# Patient Record
Sex: FEMALE | Race: WHITE
[De-identification: names, ages, dates, MRNs, and addresses within clinical notes are randomized per-mention and may not be internally consistent; named-entity substitution may affect disease eponyms.]

---

## 2018-04-11 ENCOUNTER — HOSPITAL ENCOUNTER (INPATIENT)
Dept: HOSPITAL 68 - ERH | Age: 78
LOS: 5 days | DRG: 243 | End: 2018-04-16
Admitting: INTERNAL MEDICINE
Payer: COMMERCIAL

## 2018-04-11 VITALS — WEIGHT: 175.25 LBS | BODY MASS INDEX: 26.56 KG/M2 | HEIGHT: 68 IN

## 2018-04-11 DIAGNOSIS — I44.1: Primary | ICD-10-CM

## 2018-04-11 DIAGNOSIS — A77.49: ICD-10-CM

## 2018-04-11 DIAGNOSIS — R00.1: ICD-10-CM

## 2018-04-11 DIAGNOSIS — E03.9: ICD-10-CM

## 2018-04-11 DIAGNOSIS — E78.5: ICD-10-CM

## 2018-04-11 DIAGNOSIS — M06.9: ICD-10-CM

## 2018-04-11 DIAGNOSIS — Z86.718: ICD-10-CM

## 2018-04-11 DIAGNOSIS — R07.9: ICD-10-CM

## 2018-04-11 DIAGNOSIS — D69.6: ICD-10-CM

## 2018-04-11 DIAGNOSIS — Z88.8: ICD-10-CM

## 2018-04-11 LAB
ABSOLUTE GRANULOCYTE CT: 3.2 /CUMM (ref 1.4–6.5)
APTT BLD: 33 SEC (ref 25–37)
BASOPHILS # BLD: 0 /CUMM (ref 0–0.2)
BASOPHILS NFR BLD: 0.3 % (ref 0–2)
EOSINOPHIL # BLD: 0.1 /CUMM (ref 0–0.7)
EOSINOPHIL NFR BLD: 1.7 % (ref 0–5)
ERYTHROCYTE [DISTWIDTH] IN BLOOD BY AUTOMATED COUNT: 15.4 % (ref 11.5–14.5)
GRANULOCYTES NFR BLD: 64.7 % (ref 42.2–75.2)
HCT VFR BLD CALC: 32.8 % (ref 37–47)
LYMPHOCYTES # BLD: 1.3 /CUMM (ref 1.2–3.4)
MCH RBC QN AUTO: 33.1 PG (ref 27–31)
MCHC RBC AUTO-ENTMCNC: 35 G/DL (ref 33–37)
MCV RBC AUTO: 94.6 FL (ref 81–99)
MONOCYTES # BLD: 0.3 /CUMM (ref 0.1–0.6)
PLATELET # BLD: 125 /CUMM (ref 130–400)
PMV BLD AUTO: 7.6 FL (ref 7.4–10.4)
PROTHROMBIN TIME: 13.2 SEC (ref 9.4–12.5)
RED BLOOD CELL CT: 3.47 /CUMM (ref 4.2–5.4)
WBC # BLD AUTO: 4.9 /CUMM (ref 4.8–10.8)

## 2018-04-11 PROCEDURE — C1785 PMKR, DUAL, RATE-RESP: HCPCS

## 2018-04-11 PROCEDURE — 86618 LYME DISEASE ANTIBODY: CPT

## 2018-04-11 PROCEDURE — 87798 DETECT AGENT NOS DNA AMP: CPT

## 2018-04-11 PROCEDURE — C1898 LEAD, PMKR, OTHER THAN TRANS: HCPCS

## 2018-04-11 NOTE — ED CARDIAC/CP/PALPITATIONS
History of Present Illness
 
General
Chief Complaint: Dyspnea (COPD, CHF, Other)
Stated Complaint: PT IS HAVING PROBLEM BREATHING AND CHEST PAIN
Source: patient
Exam Limitations: no limitations
 
Vital Signs & Intake/Output
Vital Signs & Intake/Output
 Vital Signs
 
 
Date Time Temp Pulse Resp B/P B/P Pulse O2 O2 Flow FiO2
 
     Mean Ox Delivery Rate 
 
2237 97.2 41 20 187/71  99 Nasal 3.0L 
 
       Cannula  
 
2155      99 Nasal 2.0L 
 
       Cannula  
 
2058 98.1 37 18 179/79  98 Room Air 3.0L 
 
 
 
Allergies
Coded Allergies:
celecoxib (From CELEBREX) (UNKNOWN 18)
rofecoxib (From VIOXX) (UNKNOWN 18)
 
Reconcile Medications
Cholecalciferol (Vitamin D3) (Vitamin D) 1,000 UNIT TABLET   1 TAB PO DAILY 
SUPPLEMENT  (Reported)
Folic Acid 1 MG TABLET   1 TAB PO DAILY SUPPLEMENT  (Reported)
Hydroxychloroquine Sulfate 200 MG TABLET   1 TAB PO BID RA/OA  (Reported)
Levothyroxine Sodium (Synthroid) 25 MCG TABLET   1 TAB PO DAILY THYROID  (
Reported)
Methotrexate 2.5 MG TABLET   8 TAB PO QTUES RA  (Reported)
Simvastatin (Simvastatin*) 40 MG TABLET   1 TAB PO QPM CHOLESTEROL  (Reported)
 
Triage Nurses Notes Reviewed? yes
Onset: Gradual
Duration: day(s):, waxing and waning
Timing: recent history
Quality/Severity: moderate
Location: substernal, central
Radiation: neck
Activities at Onset: none
Prior Chest Pain/Card Workup: "I had a cath a few years ago... it was negative.
"
Nitro Today/Relief: no nitro taken today
Aspirin Today: no aspirin today
Associated Symptoms: chest pain. 
HPI:
76 YO WOMAN,
in prior good health
presents with 8/10 sub sternal chest pain, intermittently, over the past 3 days.
 
She notes occasional radiation to left neck, no syncopal symptoms, diaphoresis, 
chills, cough, wheezing.  
 
She presently has no chest pain. 
 
Past History
 
Travel History
Traveled to [place] past 21 day No
 
Medical History
Any Pertinent Medical History? see below for history
Cardiovascular: hyperlipidemia
Musculoskeletal: arthritis
Endocrine: hypothyroidism
Pneumonia Vaccine: 10/10/09
 
Surgical History
Surgical History: non-contributory
 
Psychosocial History
Who do you live with Patient/Self
Services at Home None
What is your primary language English
 
Family History
Hx Contributory? No
 
Review of Systems
 
Review of Systems
Constitutional:
Reports: no symptoms. 
EENTM:
Reports: no symptoms. 
Respiratory:
Reports: no symptoms. 
Cardiovascular:
Reports: no symptoms. 
GI:
Reports: no symptoms. 
Genitourinary:
Reports: no symptoms. 
Musculoskeletal:
Reports: no symptoms. 
Skin:
Reports: no symptoms. 
Neurological/Psychological:
Reports: no symptoms. 
Hematologic/Endocrine:
Reports: no symptoms. 
Immunologic/Allergic:
Reports: no symptoms. 
All Other Systems: Reviewed and Negative
 
Physical Exam
 
Physical Exam
General Appearance: well developed/nourished, mild distress
Head: atraumatic, normal appearance
Eyes:
Bilateral: normal appearance. 
Ears, Nose, Throat: normal pharynx, normal ENT inspection
Neck: normal inspection, supple, full range of motion
Respiratory: normal breath sounds, chest non-tender, no respiratory distress, 
quiet respiration, lungs clear
Cardiovascular: margy cardia
Gastrointestinal: normal bowel sounds, soft, non-tender, no organomegaly
Back: normal inspection
Extremities: normal inspection, normal capillary refill, normal range of motion,
no edema
Neurologic/Psych: no motor/sensory deficits, awake, alert, oriented x 3
Skin: intact, normal color, warm/dry
 
Core Measures
ACS in differential dx? No
CVA/TIA Diagnosis No
Sepsis Present: No
Sepsis Focused Exam Completed? No
 
Progress
Differential Diagnosis: ami, heart block vs other
Plan of Care:
 Orders
 
 
Procedure Date/time Status
 
Nothing by Mouth  B Active
 
Patient Data 2247 Active
 
Saline Lock 2236 Active
 
Misc Message 2236 Active
 
ED Holding Orders 2236 Active
 
Admit to inpatient 2236 Active
 
Vital Signs 2236 Active
 
Code Status 2236 Active
 
Add-on Test (ER Only) 2137 Active
 
EKG 2137 Active
 
Add-on Test (ER Only) 2133 Active
 
THYROID STIMULATING HORMONE 2052 Complete
 
LYME TITRE 2052 Active
 
PARTIAL THROMBOPLASTIN TIME 2048 Complete
 
PROTHROMBIN TIME 2048 Complete
 
D-DIMER 2048 Complete
 
TROPONIN LEVEL 2045 Complete
 
COMPREHENSIVE METABOLIC PANEL 2045 Complete
 
CBC WITHOUT DIFFERENTIAL 2045 Complete
 
EKG 2031 Active
 
 
 Laboratory Tests
 
 
 
18:
Anion Gap 12, Estimated GFR > 60, BUN/Creatinine Ratio 17.5, Glucose 97, Calcium
8.9, Total Bilirubin 1.5  H, AST 31, ALT 27, Alkaline Phosphatase 62, Troponin I
< 0.01, Total Protein 6.9, Albumin 3.8, Globulin 3.1, Albumin/Globulin Ratio 1.2
, TSH 5.190  H, PT 13.2  H, INR 1.21  H, APTT 33, D-Dimer High Sensitivty < 200,
CBC w Diff NO MAN DIFF REQ, RBC 3.47  L, MCV 94.6, MCH 33.1  H, MCHC 35.0, RDW 
15.4  H, MPV 7.6, Gran % 64.7, Lymphocytes % 26.7, Monocytes % 6.6, Eosinophils 
% 1.7, Basophils % 0.3, Absolute Granulocytes 3.2, Absolute Lymphocytes 1.3, 
Absolute Monocytes 0.3, Absolute Eosinophils 0.1, Absolute Basophils 0, Lyme 
Disease Antibody Pending
 
Diagnostic Imaging:
Viewed by Me: Radiology Read.  Discussed w/RAD: Radiology Read. 
CXR Impression: PATIENT: IMAN AL  MEDICAL RECORD NO: [id number] PRESENT 
AGE: 77  PATIENT ACCOUNT NO: [id number] : 40  LOCATION: [facility] ORDERING 
PHYSICIAN: Nick Norris MD     SERVICE DATE:  EXAM TYPE: 
RAD - XRY-PORTABLE CHEST XRAY EXAMINATION: XR PORTABLE CHEST CLINICAL 
INFORMATION: Chest pain COMPARISON: Chest x-ray 2016 TECHNIQUE: Portable 
frontal view of the chest was obtained. 9:01 PM FINDINGS: Lungs are clear. No 
pulmonary vascular congestion. There is no pleural effusion. The heart size is 
normal. The cardiac and mediastinal contours are normal. There are 
calcifications of the thoracic aorta. There are multilevel degenerative changes 
of dorsal spine. IMPRESSION: Unremarkable examination. DICTATED BY: Benjamin Hendrix MD  DATE/TIME DICTATED:18 :RAD.BACH  DATE/TIME 
TRANSCRIBED:18 CONFIDENTIAL, DO NOT COPY WITHOUT APPROPRIATE 
AUTHORIZATION.  <Electronically signed in Other Vendor System>                  
                                                                     SIGNED BY: 
Benjamin Hendrix MD 18
Initial ED EKG: sinus margy
Repeat EKG: changed (mobitz 2 block)
 
Departure
 
Departure
Disposition: STILL A PATIENT
Condition: Stable
Clinical Impression
Primary Impression: Bradycardia
Secondary Impressions: Chest pain
Referrals:
Koko Ibarra MD (PCP/Family)
 
Departure Forms:
Customer Survey
General Discharge Information
Comments
18, 21:33... discussed with dr. cartagena... pt with intermittent chest 
pain, sinus bradycardia... merits admission for further evaluation. 
 
18, 21:54... discussed with family at length.... pt comfortable in ED, 
mentating well, stable BP. 
 
18, 22:18... ekg w/ mobitz type ii block... discussed with dr. cartagena
 
Admission Note
Spoke With:
Laury Bennett MD
Documentation of Exam:
Documentation of any treatments & extenuating circumstances including Concerns 
Regarding Discharge (functional status, medication knowledge or non-compliance, 
living conditions, etc.) that warrant an admission rather than observation: 
 
pt with symptomatic bradycardia, with evidence of type II 2nd degree av block...
pt merits monitoring, rule out.... zoll pads placed on patient. 
 
 
Critical Care Note
 
Critical Care Note
Critical Care Time: 30-74 min

## 2018-04-11 NOTE — RADIOLOGY REPORT
EXAMINATION:
XR PORTABLE CHEST
 
CLINICAL INFORMATION:
Chest pain
 
COMPARISON:
Chest x-ray 09/30/2016
 
TECHNIQUE:
Portable frontal view of the chest was obtained. 9:01 PM
 
FINDINGS:
Lungs are clear. No pulmonary vascular congestion. There is no pleural
effusion. The heart size is normal. The cardiac and mediastinal contours are
normal. There are calcifications of the thoracic aorta. There are multilevel
degenerative changes of dorsal spine.
 
IMPRESSION:
Unremarkable examination.

## 2018-04-12 VITALS — SYSTOLIC BLOOD PRESSURE: 160 MMHG | DIASTOLIC BLOOD PRESSURE: 70 MMHG

## 2018-04-12 VITALS — SYSTOLIC BLOOD PRESSURE: 142 MMHG | DIASTOLIC BLOOD PRESSURE: 84 MMHG

## 2018-04-12 VITALS — DIASTOLIC BLOOD PRESSURE: 76 MMHG | SYSTOLIC BLOOD PRESSURE: 136 MMHG

## 2018-04-12 LAB
ABSOLUTE GRANULOCYTE CT: 2.7 /CUMM (ref 1.4–6.5)
BASOPHILS # BLD: 0 /CUMM (ref 0–0.2)
BASOPHILS NFR BLD: 0.5 % (ref 0–2)
EOSINOPHIL # BLD: 0.1 /CUMM (ref 0–0.7)
EOSINOPHIL NFR BLD: 2.4 % (ref 0–5)
ERYTHROCYTE [DISTWIDTH] IN BLOOD BY AUTOMATED COUNT: 15.6 % (ref 11.5–14.5)
GRANULOCYTES NFR BLD: 62.6 % (ref 42.2–75.2)
HCT VFR BLD CALC: 31 % (ref 37–47)
LYMPHOCYTES # BLD: 1.2 /CUMM (ref 1.2–3.4)
MCH RBC QN AUTO: 32.2 PG (ref 27–31)
MCHC RBC AUTO-ENTMCNC: 33.9 G/DL (ref 33–37)
MCV RBC AUTO: 95.1 FL (ref 81–99)
MONOCYTES # BLD: 0.3 /CUMM (ref 0.1–0.6)
PLATELET # BLD: 112 /CUMM (ref 130–400)
PMV BLD AUTO: 8.1 FL (ref 7.4–10.4)
RED BLOOD CELL CT: 3.26 /CUMM (ref 4.2–5.4)
WBC # BLD AUTO: 4.4 /CUMM (ref 4.8–10.8)

## 2018-04-12 NOTE — CONS- CARDIOLOGY
General Information and HPI
 
Consulting Request
Date of Consult: 04/12/18
Requested By:
Laury Bennett MD
 
Reason for Consult:
Bradycardia
Source of Information: patient
History of Present Illness:
 
This is a 77-year-old female with a past medical history of hypothyroidism, 
rheumatoid arthritis, remote history of DVT/PE, and hyperlipidemia who presented
to [facility] with a chief complaint of intermittent shortness of breath 
with weakness and some lightheadedness along with intermittent left-sided chest 
discomfort that is sometimes aggravated by cold air; she notes that the symptoms
have been intermittent over the last few months. She also mentions that she had 
a brief loss of consciousness approximately 2 weeks ago but did not seek any 
medical care. She did note having a tick bite approximately 3 weeks ago with 
mild erythema at the site; denies subjective fever or chills. Does have some 
malaise.  Denies slurring of speech or visual changes.  Denies palpitations.  
She does report having a cardiac catheterization many years ago which she 
believes was normal.
 
Allergies/Medications
Allergies:
Coded Allergies:
celecoxib (From CELEBREX) (UNKNOWN 04/11/18)
rofecoxib (From VIOXX) (UNKNOWN 04/11/18)
 
Home Med List:
Cholecalciferol (Vitamin D3) (Vitamin D) 1,000 UNIT TABLET   1 TAB PO DAILY 
SUPPLEMENT  (Reported)
Folic Acid 1 MG TABLET   1 TAB PO DAILY SUPPLEMENT  (Reported)
Hydroxychloroquine Sulfate 200 MG TABLET   1 TAB PO BID RA/OA  (Reported)
Levothyroxine Sodium (Synthroid) 25 MCG TABLET   1 TAB PO DAILY THYROID  (
Reported)
Methotrexate 2.5 MG TABLET   8 TAB PO QTUES RA  (Reported)
Simvastatin (Simvastatin*) 40 MG TABLET   1 TAB PO QPM CHOLESTEROL  (Reported)
 
Current Medications:
 Current Medications
 
 
  Sig/Sahara Start time  Last
 
Medication Dose Route Stop Time Status Admin
 
Acetaminophen 650 MG Q6P PRN 04/11 2315 AC 
 
  PO   
 
Atorvastatin Calcium 40 MG 1700 04/12 1700 AC 
 
  PO   
 
Cholecalciferol 1,000 IU DAILY 04/12 1000 AC 
 
  PO   
 
Enoxaparin Sodium 40 MG DAILY@0900 04/12 0900 AC 
 
  SC   
 
Folic Acid 1 MG DAILY 04/12 1000 AC 
 
  PO   
 
Hydroxychloroquine  200 MG BID 04/12 1000 AC 
 
Sulfate  PO   
 
Levothyroxine Sodium 0.025 MG DAILY AC 04/12 0730 AC 
 
  PO   
 
Methotrexate 20 MG QTUES 04/17 0700 AC 
 
  PO   
 
Multivitamins 1 TAB DAILY 04/12 1000 AC 
 
  PO   
 
 
 
 
Review of Systems
Review of Systems:
Review of systems as per HPI. The remainder of a 10 point review of systems was 
reviewed and was otherwise negative.
 
Past History
 
Travel History
Traveled to [place] past 21 day No
 
Medical History
Cardiovascular: hyperlipidemia
Musculoskeletal: arthritis
Endocrine: hypothyroidism
 
Surgical History
Surgical History: non-contributory
 
Psychosocial History
Services at Home: None
 
Exam & Diagnostic Data
Vital Signs and I&O
Vital Signs
 
 
Date Time Temp Pulse Resp B/P B/P Pulse O2 O2 Flow FiO2
 
     Mean Ox Delivery Rate 
 
04/12 0629 98.2 53 20 177/74  97 Room Air  
 
04/12 0507  34 20 169/67  95 Room Air  
 
04/12 0146  97 20 160/95  98 Room Air  
 
04/12 0000  61 20 168/75  97 Room Air  
 
04/11 2322 98.1 61 20 161/71  100 Nasal 3.0L 
 
       Cannula  
 
04/11 2237 97.2 41 20 187/71  99 Nasal 3.0L 
 
       Cannula  
 
04/11 2155      99 Nasal 2.0L 
 
       Cannula  
 
04/11 2058 98.1 37 18 179/79  98 Room Air 3.0L 
 
 
 Intake & Output
 
 
 04/12 0800 04/12 0000 04/11 1600 04/11 0800 04/11 0000 04/10 1600
 
Intake Total      
 
Output Total      
 
Balance      
 
       
 
Patient  173 lb    
 
Weight      
 
Weight  Reported by Patient    
 
Measurement      
 
Method      
 
 
 
Physical Exam:
General: no apparent distress. Alert.
Eyes: No obvious scleral icterus.
HEENT: No jugular venous distention or abnormal jugular venous pulsations.
Cardiovascular: Normal intensity S1/S2.  Regular bradycardia
Respiratory: Lungs clear to auscultation bilaterally.
Abdomen: Soft, nontender with no guarding or rebound tenderness.
Musculoskeletal: No clubbing or cyanosis noted
Skin: No obvious rashes or ulcerations.
Neurologic: No gross focal deficits noted.
Lymph: No gross lymphadenopathy. 
Labs/Dimas Results:
 Laboratory Tests
 
 
 04/12 04/11
 
 0504 2052
 
Chemistry  
 
  Sodium (137 - 145 mmol/L) 145 145
 
  Potassium (3.5 - 5.1 mmol/L) 4.3 3.9
 
  Chloride (98 - 107 mmol/L) 108  H 108  H
 
  Carbon Dioxide (22 - 30 mmol/L) 29 26
 
  Anion Gap (5 - 16) 8 12
 
  BUN (7 - 17 mg/dL) 14 14
 
  Creatinine (0.5 - 1.0 mg/dL) 0.8 0.8
 
  Estimated GFR (>60 ml/min) > 60 > 60
 
  BUN/Creatinine Ratio (7 - 25 %) 17.5 17.5
 
  Glucose (65 - 99 mg/dL)  97
 
  Calcium (8.4 - 10.2 mg/dL)  8.9
 
  Total Bilirubin (0.2 - 1.3 mg/dL)  1.5  H
 
  AST (14 - 36 U/L)  31
 
  ALT (9 - 52 U/L)  27
 
  Alkaline Phosphatase (<127 U/L)  62
 
  Troponin I (< 0.11 ng/ml)  < 0.01
 
  Total Protein (6.3 - 8.2 g/dL)  6.9
 
  Albumin (3.5 - 5.0 g/dL)  3.8
 
  Globulin (1.9 - 4.2 gm/dL)  3.1
 
  Albumin/Globulin Ratio (1.1 - 2.2 %)  1.2
 
  TSH (0.270 - 4.200 uIU/mL)  5.190  H
 
  Thyroxine (T4) (4.5 - 10.9 ug/dL)  8.3
 
  Total T3 (0.97 - 1.69 ng/mL)  1.11
 
Coagulation  
 
  PT (9.4 - 12.5 SEC)  13.2  H
 
  INR (0.90 - 1.19)  1.21  H
 
  APTT (25 - 37 SEC)  33
 
  D-Dimer High Sensitivty (0 - 243 ng/ml)  < 200
 
Hematology  
 
  CBC w Diff NO MAN DIFF REQ NO MAN DIFF REQ
 
  WBC (4.8 - 10.8 /CUMM) 4.4  L 4.9
 
  RBC (4.20 - 5.40 /CUMM) 3.26  L 3.47  L
 
  Hgb (12.0 - 16.0 G/DL) 10.5  L 11.4  L
 
  Hct (37 - 47 %) 31.0  L 32.8  L
 
  MCV (81.0 - 99.0 FL) 95.1 94.6
 
  MCH (27.0 - 31.0 PG) 32.2  H 33.1  H
 
  MCHC (33.0 - 37.0 G/DL) 33.9 35.0
 
  RDW (11.5 - 14.5 %) 15.6  H 15.4  H
 
  Plt Count (130 - 400 /CUMM) 112  L 125  L
 
  MPV (7.4 - 10.4 FL) 8.1 7.6
 
  Gran % (42.2 - 75.2 %) 62.6 64.7
 
  Lymphocytes % (20.5 - 51.1 %) 28.4 26.7
 
  Monocytes % (1.7 - 9.3 %) 6.1 6.6
 
  Eosinophils % (0 - 5 %) 2.4 1.7
 
  Basophils % (0.0 - 2.0 %) 0.5 0.3
 
  Absolute Granulocytes (1.4 - 6.5 /CUMM) 2.7 3.2
 
  Absolute Lymphocytes (1.2 - 3.4 /CUMM) 1.2 1.3
 
  Absolute Monocytes (0.10 - 0.60 /CUMM) 0.3 0.3
 
  Absolute Eosinophils (0.0 - 0.7 /CUMM) 0.1 0.1
 
  Absolute Basophils (0.0 - 0.2 /CUMM) 0 0
 
Serology  
 
  Lyme Disease Antibody  Pending
 
 
 
 
Diagnostic Data
EKG Results
Tracing was personally reviewed and shows a sinus rhythm with 2-1 AV block and a
ventricular response rate of approximately 34 bpm with left axis deviation
CXR Results
No CHF
Other Results
Telemetry tracings were personally reviewed and shows sinus bradycardia with 
intermittent 2-1 AV block; no prolonged pauses noted
 
Assessment/Plan
Assessment/Plan
 
1.  Symptomatic bradycardia with 2-1 AV block
2.  History of hypothyroidism
3.  Intermittent chest pain/shortness of breath
4.  History of rheumatoid arthritis
5.  History of hyperlipidemia
6.  Remote history of DVT/PE
 
The patient's symptoms may be due to her bradycardia with 2-1 AV block; unlikely
due to ACS or CHF but would recommend serial troponins and transthoracic 
echocardiogram. She does report a tick bite approximately 3 weeks ago; agree 
with workup for Lyme disease but if negative then she will likely need a 
permanent pacemaker. No other clearly reversible causes of bradycardia noted at 
this time. D-dimer is within normal limits which has high negative predictive 
value for pulmonary embolus. She may be a candidate for ischemic testing in the 
future.
 
 
Theodore Tijerina MD [facility]
 
 
Consult Acknowledgment
- Thank you for your consult request.

## 2018-04-12 NOTE — CONS- INFECT DISEASE
General Information and HPI
 
Consulting Request
Date of Consult: 04/12/18
Requested By:
Laury Bennett MD
 
Reason for Consult:
Rule out Lyme carditis
Source of Information: patient, family
History of Present Illness:
This is a 77-year-old woman with a history of rheumatoid arthritis, maintained 
on hydroxychloroquine and methotrexate, hypothyroidism and hyperlipidemia 
admitted on April 11 after presenting to the emergency room with a 6 month 
history of palpitations, associated with chest pressure, radiating to the left 
shoulder, and shortness of breath, worse on exertion, increased in frequency 
recently, with one syncopal episode several weeks prior to admission and status 
post removal of a tick from her left shoulder 1 week prior to admission.  On 
admission she was afebrile, with a heart rate of 37.  Laboratory data revealed a
white blood cell count of 5000, H&H 11 and 33, platelets 125,000, BUN/creatinine
14 and 0.8, bilirubin 1.5, SH 5.19, INR 1.21.  Chest x-ray was negative.  EKG 
revealed a 2:1 AV heart block and she has been evaluated by Cardiology for a 
possible pacemaker.
 
 
Allergies/Medications
Allergies:
Coded Allergies:
celecoxib (From CELEBREX) (UNKNOWN 04/11/18)
rofecoxib (From VIOXX) (UNKNOWN 04/11/18)
 
Home Med List:
Cholecalciferol (Vitamin D3) (Vitamin D) 1,000 UNIT TABLET   1 TAB PO DAILY 
SUPPLEMENT  (Reported)
Folic Acid 1 MG TABLET   1 TAB PO DAILY SUPPLEMENT  (Reported)
Hydroxychloroquine Sulfate 200 MG TABLET   1 TAB PO BID RA/OA  (Reported)
Levothyroxine Sodium (Synthroid) 25 MCG TABLET   1 TAB PO DAILY THYROID  (
Reported)
Methotrexate 2.5 MG TABLET   8 TAB PO QTUES RA  (Reported)
Simvastatin (Simvastatin*) 40 MG TABLET   1 TAB PO QPM CHOLESTEROL  (Reported)
 
 
Past History
 
Travel History
Traveled to [place] past 21 day No
 
Medical History
Cardiovascular: hyperlipidemia
Musculoskeletal: arthritis
Endocrine: hypothyroidism
Blood Disorders: DVT (1968), PE (1968)
Pneumonia Vaccine: 10/10/09
 
Surgical History
Surgical History: knee replacement (bilateral)
 
Psychosocial History
Services at Home: None
 
Review of Systems
 
Review of Systems
All Other Systems: Reviewed and Negative
 
Exam & Diagnostic Data
Last 24 Hrs of Vital Signs/I&O
 Vital Signs
 
 
Date Time Temp Pulse Resp B/P B/P Pulse O2 O2 Flow FiO2
 
     Mean Ox Delivery Rate 
 
04/12 1350 98.5 62 20 160/70  97 Room Air  
 
04/12 1058 98.5 32 20 176/72  96 Room Air  
 
04/12 0745 98.0 63 20 175/74  96 Room Air  
 
04/12 0629 98.2 53 20 177/74  97 Room Air  
 
04/12 0507  34 20 169/67  95 Room Air  
 
04/12 0146  97 20 160/95  98 Room Air  
 
04/12 0000  61 20 168/75  97 Room Air  
 
04/11 2322 98.1 61 20 161/71  100 Nasal 3.0L 
 
       Cannula  
 
04/11 2237 97.2 41 20 187/71  99 Nasal 3.0L 
 
       Cannula  
 
04/11 2155      99 Nasal 2.0L 
 
       Cannula  
 
04/11 2058 98.1 37 18 179/79  98 Room Air 3.0L 
 
 
 Intake & Output
 
 
 04/12 1600 04/12 0800 04/12 0000
 
Intake Total   
 
Output Total   
 
Balance   
 
    
 
Patient   173 lb
 
Weight   
 
Weight   Reported by Patient
 
Measurement   
 
Method   
 
 
 
 
Physical Exam
Other Physical Findings:
Afebrile.  She is awake and alert in no acute distress.  Skin reveals no rash.  
HEENT negative.  Neck is supple with no adenopathy.  Lungs are clear.  Heart 
bradycardic, with a soft diastolic murmur.  Abdomen is soft, nontender with 
positive bowel sounds.  Back no CVA tenderness.  Extremities no cyanosis, 
clubbing or edema.  Neuro is without focality.
 
Last 24 Hours of Lab Results:
 Laboratory Tests
 
 
 04/12 04/11
 
 0504 2052
 
Chemistry  
 
  Sodium (137 - 145 mmol/L) 145 145
 
  Potassium (3.5 - 5.1 mmol/L) 4.3 3.9
 
  Chloride (98 - 107 mmol/L) 108  H 108  H
 
  Carbon Dioxide (22 - 30 mmol/L) 29 26
 
  Anion Gap (5 - 16) 8 12
 
  BUN (7 - 17 mg/dL) 14 14
 
  Creatinine (0.5 - 1.0 mg/dL) 0.8 0.8
 
  Estimated GFR (>60 ml/min) > 60 > 60
 
  BUN/Creatinine Ratio (7 - 25 %) 17.5 17.5
 
  Glucose (65 - 99 mg/dL)  97
 
  Calcium (8.4 - 10.2 mg/dL)  8.9
 
  Total Bilirubin (0.2 - 1.3 mg/dL)  1.5  H
 
  AST (14 - 36 U/L)  31
 
  ALT (9 - 52 U/L)  27
 
  Alkaline Phosphatase (<127 U/L)  62
 
  Troponin I (< 0.11 ng/ml)  < 0.01
 
  Total Protein (6.3 - 8.2 g/dL)  6.9
 
  Albumin (3.5 - 5.0 g/dL)  3.8
 
  Globulin (1.9 - 4.2 gm/dL)  3.1
 
  Albumin/Globulin Ratio (1.1 - 2.2 %)  1.2
 
  TSH (0.270 - 4.200 uIU/mL)  5.190  H
 
  Thyroxine (T4) (4.5 - 10.9 ug/dL)  8.3
 
  Total T3 (0.97 - 1.69 ng/mL)  1.11
 
Coagulation  
 
  PT (9.4 - 12.5 SEC)  13.2  H
 
  INR (0.90 - 1.19)  1.21  H
 
  APTT (25 - 37 SEC)  33
 
  D-Dimer High Sensitivty (0 - 243 ng/ml)  < 200
 
Hematology  
 
  CBC w Diff NO MAN DIFF REQ NO MAN DIFF REQ
 
  WBC (4.8 - 10.8 /CUMM) 4.4  L 4.9
 
  RBC (4.20 - 5.40 /CUMM) 3.26  L 3.47  L
 
  Hgb (12.0 - 16.0 G/DL) 10.5  L 11.4  L
 
  Hct (37 - 47 %) 31.0  L 32.8  L
 
  MCV (81.0 - 99.0 FL) 95.1 94.6
 
  MCH (27.0 - 31.0 PG) 32.2  H 33.1  H
 
  MCHC (33.0 - 37.0 G/DL) 33.9 35.0
 
  RDW (11.5 - 14.5 %) 15.6  H 15.4  H
 
  Plt Count (130 - 400 /CUMM) 112  L 125  L
 
  MPV (7.4 - 10.4 FL) 8.1 7.6
 
  Gran % (42.2 - 75.2 %) 62.6 64.7
 
  Lymphocytes % (20.5 - 51.1 %) 28.4 26.7
 
  Monocytes % (1.7 - 9.3 %) 6.1 6.6
 
  Eosinophils % (0 - 5 %) 2.4 1.7
 
  Basophils % (0.0 - 2.0 %) 0.5 0.3
 
  Absolute Granulocytes (1.4 - 6.5 /CUMM) 2.7 3.2
 
  Absolute Lymphocytes (1.2 - 3.4 /CUMM) 1.2 1.3
 
  Absolute Monocytes (0.10 - 0.60 /CUMM) 0.3 0.3
 
  Absolute Eosinophils (0.0 - 0.7 /CUMM) 0.1 0.1
 
  Absolute Basophils (0.0 - 0.2 /CUMM) 0 0
 
Serology  
 
  Lyme Disease Antibody (RATIO)  0.08
 
 
 
Last 24 Hours of Dimas Results:
No cultures obtained
 
 
Diagnostic Data
Recent Imaging Findings:
Chest x-ray April 11 negative
 
 
Assessment/Plan
 
Assessment/Plan
Impression:
This is a 77-year-old woman with a history of rheumatoid arthritis, maintained 
on hydroxychloroquine and methotrexate, admitted on April 11 with a 6 month 
history of palpitations, associated with chest pressure, radiating to the left 
shoulder, and shortness of breath, worse on exertion, increased in frequency 
recently, with one syncopal episode several weeks prior to admission and status 
post removal of a tick from her left shoulder 1 week prior to admission, found 
to be afebrile with bradycardia and a 2:1 heart block on EKG, with a mild 
thrombocytopenia and a negative Lyme titer. 
 
She appears to have symptomatic bradycardia and, per Cardiology, she will likely
require a permanent pacemaker.  Lyme carditis is possible but, as this 
represents a second stage of Lyme, the Lyme titer is usually positive; therefore
, with a negative titer, it is less likely, particularly as her symptoms have 
been going on for the past 6 months.  Her thrombocytopenia may be medication 
related (for example the hydroxychloroquine or the methotrexate) but, given her 
recent tick bite, Anaplasma could be considered, and it may be reasonable to 
empirically treat her for this pending further evaluation.  Of note Anaplasma is
not associated with cardiac abnormalities.
 
Suggestion:
1.  Serum for PCR for Anaplasma
2.  Further management with regard to possible pacemaker placement per 
Cardiology
3.  Begin Doxycycline 100 mg po every 12 hours pending above
 
 
 
Consult Acknowledgment
- Thank you for your consult request.

## 2018-04-12 NOTE — ADMISSION CERTIFICATION
Admission Certification
 
Certification Statement
- As attending physician, I certify that at the time of
- admission, based on clinical presentation, severity of
- symptoms, need for further diagnostic testing and
- therapeutic interventions, and risk of adverse outcomes
- without in-hospital treatment, in my clinical assessment,
- this patient requires an acute hospital stay for a minimum
- of two nights or longer. I have also considered psychsocial
- factors such as support system, advanced age, financial
- issues, cognitive issues, and failed out-patient treatments,
- past re-admission history, safety of patient, and lack of
- compliance as applicable.
Specific rationale supporting this admission is:
Symptomatic bradycardia with Mobitz type II block 2:1

## 2018-04-12 NOTE — PN- HOUSESTAFF
Kavon Khan 18 0932:
Subjective
Follow-up For:
Symptomatic bradycardia
Possible Lyme disease
Subjective:
The patient was seen and examined.  She reports that she experiences dizziness 
when she moves.  Also has been noticing occasional palpitation.  Per patient, 
her symptoms started 4-5 months ago and gradually have been worsening.  She had 
tick bite to her left shoulder 2 weeks ago with a rash did not seek any medical 
attention.
 
Per patient, she was diagnosed with hypothyroidism recently by her primary care 
doctor and was started  on levothyroxine about a month ago.  She levothyroxine 
for 1 week, started experiencing lightheadedness which she attributed to the 
medication therefore stopped the medication. 
 
Continues to be bradycardic.
 
Review of Systems
Constitutional:
Reports: see HPI. 
 
Objective
Last 24 Hrs of Vital Signs/I&O
 Vital Signs
 
 
Date Time Temp Pulse Resp B/P B/P Pulse O2 O2 Flow FiO2
 
     Mean Ox Delivery Rate 
 
 1756 98.6 37 18 160/80  96 Room Air  
 
 1604 98.1 34 18 160/62  97 Room Air  
 
 1400 98.5 62 20 160/70  97 Room Air  
 
 1350 98.5 62 20 160/70  97 Room Air  
 
 1058 98.5 32 20 176/72  96 Room Air  
 
 0745 98.0 63 20 175/74  96 Room Air  
 
 0629 98.2 53 20 177/74  97 Room Air  
 
 0507  34 20 169/67  95 Room Air  
 
 0146  97 20 160/95  98 Room Air  
 
 0000  61 20 168/75  97 Room Air  
 
 2322 98.1 61 20 161/71  100 Nasal 3.0L 
 
       Cannula  
 
 2237 97.2 41 20 187/71  99 Nasal 3.0L 
 
       Cannula  
 
5      99 Nasal 2.0L 
 
       Cannula  
 
2058 98.1 37 18 179/79  98 Room Air 3.0L 
 
 
 Intake & Output
 
 
  1600  0800  0000
 
Intake Total 240  
 
Output Total   
 
Balance 240  
 
    
 
Intake, Oral 240  
 
Patient 173 lb  173 lb
 
Weight   
 
Weight   Reported by Patient
 
Measurement   
 
Method   
 
 
 
 
Physical Exam
General Appearance: Alert, Oriented X3, Cooperative, No Acute Distress
Skin: No Rashes
HEENT: Atraumatic, PERRLA, EOMI, Mucous Membr. moist/pink
Neck: Supple
Cardiovascular: Bradycardic
Lungs: Clear to Auscultation, Normal Air Movement
Abdomen: Normal Bowel Sounds, Soft, No Tenderness, No Hepatospenomegaly, No 
Masses
Neurological: Normal Speech, Strength at 5/5 X4 Ext, Normal Tone, Sensation 
Intact, Cranial Nerves 3-12 NL
Extremities: No Clubbing, No Cyanosis, No Edema, Normal Pulses, No Tenderness/
Swelling
Current Medications:
 Current Medications
 
 
  Sig/Sahara Start time  Last
 
Medication Dose Route Stop Time Status Admin
 
Acetaminophen 650 MG Q6P PRN  2315 AC 
 
  PO   
 
Atorvastatin Calcium 40 MG 1700  1700 AC 
 
  PO   
 
Cholecalciferol 1,000 IU DAILY  1045 AC 
 
  PO   1050
 
Cholecalciferol 1,000 IU DAILY  1000 DC 
 
  PO   
 
Doxycycline Hyclate 100 MG BID  2100 AC 
 
  PO   
 
Enoxaparin Sodium 40 MG DAILY@0900  0900 DC 
 
  SC   0850
 
Folic Acid 1 MG DAILY  1045 AC 
 
  PO   1050
 
Folic Acid 1 MG DAILY  1000 DC 
 
  PO   
 
Hydroxychloroquine  200 MG BID  1046 AC 
 
Sulfate  PO   1050
 
Hydroxychloroquine  200 MG BID  1000 DC 
 
Sulfate  PO   
 
Levothyroxine Sodium 0.025 MG DAILY AC  0730 AC 
 
  PO   0745
 
Methotrexate 20 MG QTUES  0700 AC 
 
  PO   
 
Multivitamins 1 TAB DAILY  1047 AC 
 
  PO   1050
 
Multivitamins 1 TAB DAILY  1000 DC 
 
  PO   
 
 
 
 
Last 24 Hrs of Lab/Dimas Results
Last 24 Hrs of Labs/Mics:
 Laboratory Tests
 
18 0504:
Anion Gap 8, Estimated GFR > 60, BUN/Creatinine Ratio 17.5, CBC w Diff NO MAN 
DIFF REQ, RBC 3.26  L, MCV 95.1, MCH 32.2  H, MCHC 33.9, RDW 15.6  H, MPV 8.1, 
Gran % 62.6, Lymphocytes % 28.4, Monocytes % 6.1, Eosinophils % 2.4, Basophils %
0.5, Absolute Granulocytes 2.7, Absolute Lymphocytes 1.2, Absolute Monocytes 0.3
, Absolute Eosinophils 0.1, Absolute Basophils 0, A.phagocytophil DNA PCR 
Pending
 
18:
Anion Gap 12, Estimated GFR > 60, BUN/Creatinine Ratio 17.5, Glucose 97, Calcium
8.9, Total Bilirubin 1.5  H, AST 31, ALT 27, Alkaline Phosphatase 62, Troponin I
< 0.01, Total Protein 6.9, Albumin 3.8, Globulin 3.1, Albumin/Globulin Ratio 1.2
, TSH 5.190  H, Thyroxine (T4) 8.3, Total T3 1.11, PT 13.2  H, INR 1.21  H, APTT
33, D-Dimer High Sensitivty < 200, CBC w Diff NO MAN DIFF REQ, RBC 3.47  L, MCV 
94.6, MCH 33.1  H, MCHC 35.0, RDW 15.4  H, MPV 7.6, Gran % 64.7, Lymphocytes % 
26.7, Monocytes % 6.6, Eosinophils % 1.7, Basophils % 0.3, Absolute Granulocytes
3.2, Absolute Lymphocytes 1.3, Absolute Monocytes 0.3, Absolute Eosinophils 0.1,
Absolute Basophils 0, Lyme Disease Antibody 0.08
 
 
Assessment/Plan
Assessment:
This is a 77-year-old lady with past medical history significant for RA on 
hydroxychloroquine, recently diagnosed hypothyroidism, hyperlipidemia, who 
presented to the hospital with palpitation, shortness of breath, and dizziness.
 
Reports having history of tick bite 2 weeks ago on her left shoulder with a 
rash.
 
Problem list
* Symptomatic bradycardia, Mobitz type II block
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 
hypothyroidism 
Plan
* Monitor on telemetry
* Keep transcutaneus pacer ready
* Lyme titer PCR for anaplasma
* ID consult appreciated, will start the patient on doxycycline
* Will check serial trop and EKG
* Cardiology consult appreciated, nothing by mouth after midnight for possible 
pacemaker insertion
* Echocardiogram pending
* Continue levothyroxine
* DVT prophylaxis at all times
Problem List:
 1. Bradycardia
 
Pain Ratin
Pain Location:
NA
Pain Goal: Remain pain free
Pain Plan:
NA
Tomorrow's Labs & Rationales:
CBC to monitor Plt and H&H
 
 
Alexander Gerber 18 1309:
Attending MD Review Statement
 
Attending Statement
Attending MD Statement: examined this patient, discuss w/resident/PA/NP, agreed 
w/resident/PA/NP, discussed with family, reviewed EMR data (avail), discussed 
with nursing, discussed with case mgmt, reviewed images, amended to note
Attending Assessment/Plan:
77 o/f with pmh of hypothyroidism, rheumatoid arthritis, remote history of DVT/
PE, and hyperlipidemia who came shortness of breath and lightheadedness with 
atypical chest pain. Patient gives history of tick bite. 
 
Patient admitted to telemetry montioring for bradycardia 2-1 AV block. Serial 
cardiac enzymes r/o MI. Cardiology consult. Pacer bedside. ECHO, ID consult ?abx
for ?lyme disease. Avoid medications causing bradycardia.

## 2018-04-13 VITALS — SYSTOLIC BLOOD PRESSURE: 140 MMHG | DIASTOLIC BLOOD PRESSURE: 78 MMHG

## 2018-04-13 VITALS — DIASTOLIC BLOOD PRESSURE: 72 MMHG | SYSTOLIC BLOOD PRESSURE: 158 MMHG

## 2018-04-13 LAB
ABSOLUTE GRANULOCYTE CT: 2.3 /CUMM (ref 1.4–6.5)
BASOPHILS # BLD: 0 /CUMM (ref 0–0.2)
BASOPHILS NFR BLD: 0.5 % (ref 0–2)
EOSINOPHIL # BLD: 0.1 /CUMM (ref 0–0.7)
EOSINOPHIL NFR BLD: 2.2 % (ref 0–5)
ERYTHROCYTE [DISTWIDTH] IN BLOOD BY AUTOMATED COUNT: 15.5 % (ref 11.5–14.5)
GRANULOCYTES NFR BLD: 61.1 % (ref 42.2–75.2)
HCT VFR BLD CALC: 33.8 % (ref 37–47)
LYMPHOCYTES # BLD: 1.2 /CUMM (ref 1.2–3.4)
MCH RBC QN AUTO: 32.6 PG (ref 27–31)
MCHC RBC AUTO-ENTMCNC: 34.2 G/DL (ref 33–37)
MCV RBC AUTO: 95.1 FL (ref 81–99)
MONOCYTES # BLD: 0.2 /CUMM (ref 0.1–0.6)
PLATELET # BLD: 126 /CUMM (ref 130–400)
PMV BLD AUTO: 8 FL (ref 7.4–10.4)
RED BLOOD CELL CT: 3.56 /CUMM (ref 4.2–5.4)
WBC # BLD AUTO: 3.8 /CUMM (ref 4.8–10.8)

## 2018-04-13 PROCEDURE — 0JH606Z INSERTION OF PACEMAKER, DUAL CHAMBER INTO CHEST SUBCUTANEOUS TISSUE AND FASCIA, OPEN APPROACH: ICD-10-PCS | Performed by: THORACIC SURGERY (CARDIOTHORACIC VASCULAR SURGERY)

## 2018-04-13 PROCEDURE — 02HK3JZ INSERTION OF PACEMAKER LEAD INTO RIGHT VENTRICLE, PERCUTANEOUS APPROACH: ICD-10-PCS | Performed by: THORACIC SURGERY (CARDIOTHORACIC VASCULAR SURGERY)

## 2018-04-13 PROCEDURE — 02H63JZ INSERTION OF PACEMAKER LEAD INTO RIGHT ATRIUM, PERCUTANEOUS APPROACH: ICD-10-PCS | Performed by: THORACIC SURGERY (CARDIOTHORACIC VASCULAR SURGERY)

## 2018-04-13 NOTE — OPERATIVE REPORT
Operative/Inv Procedure Report
Surgery Date: 04/13/18
Name of Procedure:
MRI compatible dual-chamber permanent pacemaker
Pre-Operative Diagnosis:
Symptomatic bradycardia with second-degree heart block
Post-Operative Diagnosis:
Same
Estimated Blood Loss: less than 50ml
Surgeon/Assistant:
Bogdan Almaguer MD
 
Anesthesia: local monitored anesthesi
 
Operative/Procedure Note
Note:
After placement of monitoring lines a verification of a transcutaneous pacemaker
device the patient's left chest and shoulder area were prepped and draped in a 
sterile fashion.  1% lidocaine was used local anesthetic.  Incision was made in 
the deltopectoral groove and carried down to prepectoralis fascia.  The cephalic
vein was encountered and was encircled with surgical ties.
 
The vein was occluded proximally a small venotomy was made.  A guidewire was 
passed under fluoroscopic guidance into the right atrium.  A 9 Macanese sheath 
dilator was passed over the wire and a ventricular lead Medtronic model #[id number] 
was then advanced the pulmonary outflow tract.  It was withdrawn into the right 
ventricular chamber and positioned at the apex.
 
R waves were sensed at 6.8 mV.  The pacing threshold was 0.4 V with a current of
0.2 mA and an impedance of 1612 ohms.
 
A 7 Macanese sheath dilator was then passed over the retained wire and a preformed
atrial lead Medtronic model #[id number] was then positioned in the right atrial 
appendage under fluoroscopic guidance.
 
P waves were measured at 1.5 mV.  The pacing threshold was 0.5 V with a current 
of 0.8 mA and an impedance of 583 ohms.
 
The leads were tied to the cephalic vein and then sutured to the prepectoralis 
fascia.  A pacemaker pocket site was made with electrocautery.  The leads were 
then connected to a Medtronic MRI compatible dual-chamber pacemaker.
 
The pacemaker pocket was irrigated with antibiotic irrigation.  Hemostasis was 
achieved with electrocautery and with surgical clips.  The wound was closed in 
layers deep Vicryl suture followed by running Vicryl subcuticular suture.  It 
was dressed with dry sterile dressing and a pressure dressing.  The patient 
tolerated procedure well and was brought to the recovery room in stable 
condition.
CC:
Breezy DYE,Travon

## 2018-04-13 NOTE — PN- CARDIOLOGY
Subjective
Subjective:
 
Doing very well and is feeling better status post permanent pacemaker.
 
Objective
Vital Signs and I&Os
Vital Signs
 
 
Date Time Temp Pulse Resp B/P B/P Pulse O2 O2 Flow FiO2
 
     Mean Ox Delivery Rate 
 
04/13 0653 98.7 33 20 158/72  94 Room Air  
 
04/12 2224 98.4 32 20 136/76  94   
 
04/12 2043 98.2 36 20 142/84  96   
 
04/12 1756 98.6 37 18 160/80  96 Room Air  
 
04/12 1604 98.1 34 18 160/62  97 Room Air  
 
 
 Intake & Output
 
 
 04/13 1600 04/13 0800 04/13 0000 04/12 1600 04/12 0800 04/12 0000
 
Intake Total 580   240  
 
Output Total 600  500   
 
Balance -20  -500 240  
 
       
 
Intake,      
 
Intake, Oral 480   240  
 
Output, Urine 600  500   
 
Patient   183 lb 173 lb  173 lb
 
Weight      
 
Weight      Reported by Patient
 
Measurement      
 
Method      
 
 
 
Physical Exam:
 
General: no apparent distress. Alert.
Eyes: No obvious scleral icterus.
HEENT: No jugular venous distention or abnormal jugular venous pulsations.
Cardiovascular: Normal intensity S1/S2.  Left sided pressure dressing
Respiratory: Lungs clear to auscultation bilaterally.
Abdomen: Soft, nontender with no guarding or rebound tenderness.
Musculoskeletal: No clubbing or cyanosis noted
Skin: No obvious rashes or ulcerations.
Neurologic: No gross focal deficits noted.
 
Current Medications:
 Current Medications
 
 
  Sig/Sahara Start time  Last
 
Medication Dose Route Stop Time Status Admin
 
Acetaminophen 1,000 MG Q6H 04/13 1000 AC 04/13
 
N/A 1 UNIT IV 04/14 0414  1130
 
Acetaminophen 650 MG Q6P PRN 04/11 2315 DC 
 
  PO   
 
Atorvastatin Calcium 40 MG 1700 04/12 1700 AC 04/12
 
  PO   1914
 
Cefazolin Sodium 2,000 MG IQ8 04/13 1600 CAN 
 
  IV 04/14 0001  
 
Cefazolin Sodium 2 GM IQ8 04/13 1600 AC 
 
N/A 1 UNIT IV 04/14 0029  
 
Cholecalciferol 1,000 IU DAILY 04/12 1045 AC 04/13
 
  PO   1123
 
Doxycycline Hyclate 100 MG BID 04/12 2100 AC 04/13
 
  PO   1123
 
Enoxaparin Sodium 40 MG DAILY 04/14 0900 AC 
 
  SC   
 
Enoxaparin Sodium 40 MG DAILY@0900 04/12 0900 DC 04/12
 
  SC   0850
 
Folic Acid 1 MG DAILY 04/12 1045 AC 04/13
 
  PO   1123
 
Hydroxychloroquine  200 MG BID 04/12 1046 AC 04/13
 
Sulfate  PO   1123
 
Levothyroxine Sodium 0.025 MG DAILY AC 04/12 0730 AC 04/13
 
  PO   0547
 
Methotrexate 20 MG QTUES 04/17 0700 AC 
 
  PO   
 
Multivitamins 1 TAB DAILY 04/12 1047 AC 04/13
 
  PO   1123
 
Oxycodone HCl 5 MG Q4-6 PRN PRN 04/13 1000 AC 
 
  PO   
 
Oxycodone HCl 10 MG Q4-6 PRN PRN 04/13 1000 AC 
 
  PO   
 
 
 
 
Results
Last 48 Hrs of Labs/Mics:
 Laboratory Tests
 
04/13/18 0720:
CBC w Diff NO MAN DIFF REQ, RBC 3.56  L, MCV 95.1, MCH 32.6  H, MCHC 34.2, RDW 
15.5  H, MPV 8.0, Gran % 61.1, Lymphocytes % 30.3, Monocytes % 5.9, Eosinophils 
% 2.2, Basophils % 0.5, Absolute Granulocytes 2.3, Absolute Lymphocytes 1.2, 
Absolute Monocytes 0.2, Absolute Eosinophils 0.1, Absolute Basophils 0
 
04/13/18 0040:
Troponin I < 0.01
 
04/12/18 1925:
Troponin I < 0.01
 
04/12/18 0504:
Anion Gap 8, Estimated GFR > 60, BUN/Creatinine Ratio 17.5, CBC w Diff NO MAN 
DIFF REQ, RBC 3.26  L, MCV 95.1, MCH 32.2  H, MCHC 33.9, RDW 15.6  H, MPV 8.1, 
Gran % 62.6, Lymphocytes % 28.4, Monocytes % 6.1, Eosinophils % 2.4, Basophils %
0.5, Absolute Granulocytes 2.7, Absolute Lymphocytes 1.2, Absolute Monocytes 0.3
, Absolute Eosinophils 0.1, Absolute Basophils 0, A.phagocytophil DNA PCR 
Pending
 
04/11/18 2052:
Anion Gap 12, Estimated GFR > 60, BUN/Creatinine Ratio 17.5, Glucose 97, Calcium
8.9, Total Bilirubin 1.5  H, AST 31, ALT 27, Alkaline Phosphatase 62, Troponin I
< 0.01, Total Protein 6.9, Albumin 3.8, Globulin 3.1, Albumin/Globulin Ratio 1.2
, TSH 5.190  H, Thyroxine (T4) 8.3, Total T3 1.11, PT 13.2  H, INR 1.21  H, APTT
33, D-Dimer High Sensitivty < 200, CBC w Diff NO MAN DIFF REQ, RBC 3.47  L, MCV 
94.6, MCH 33.1  H, MCHC 35.0, RDW 15.4  H, MPV 7.6, Gran % 64.7, Lymphocytes % 
26.7, Monocytes % 6.6, Eosinophils % 1.7, Basophils % 0.3, Absolute Granulocytes
3.2, Absolute Lymphocytes 1.3, Absolute Monocytes 0.3, Absolute Eosinophils 0.1,
Absolute Basophils 0, Lyme Disease Antibody 0.08
 
Recent Imaging Studies:
 
Telemetry tracings overnight showed sinus bradycardia
 
CXR:
IMPRESSION:
No evidence of pneumothorax status post placement of a dual-chamber left
pectoral pacemaker.
 
Assessment/Plan
Assessment/Plan
 
1.  Symptomatic bradycardia with 2-1 AV block now status post dual-chamber 
pacemaker
2.  History of hypothyroidism
3.  Intermittent chest pain/shortness of breath
4.  History of rheumatoid arthritis
5.  History of hyperlipidemia
6.  Remote history of DVT/PE
 
 
The patient is feeling better status post dual-chamber pacemaker. Post procedure
x-ray shows no evidence of pneumothorax. Echo is pending. If she continues to do
well I suspect she can likely be discharged tomorrow.
 
Theodore Tijerina MD [facility]
Continue telemetry? Yes

## 2018-04-13 NOTE — PATIENT DISCHARGE INSTRUCTIONS
Discharge Instructions
 
General Discharge Information
You were seen/treated for:
Bradycardi(Slow heart rate)
 
Special Instructions:
-Please follow up with your PCP within a week of discharge. 
-Please follow up with Cardiologist, Dr. Tijerina within week of discharge.
-Please follow up with Dr. Almaguer, Cardiothoracic surgeon, within a week of 
discharge. 
-You underwent placement of a dual-chamber MRI compatible pacemaker.
-Please return to the hospital if your symptoms not improve/worsen. 
 
 
Diet
Recommended Diet: Heart Healthy
 
Activity
Additional ACTIVITY Info:
As tolerated. 
 
Acute Coronary Syndrome
 
Inclusion Criteria
At DC or during hospital stay patient has or had the following:
 
Discharge Core Measures
Meds if any: Prescribed or Continued at Discharge
Meds if any: NOT Prescribed or Continued at Discharge
 
Congestive Heart Failure
 
Inclusion Criteria
At DC or during hospital stay patient has or had the following:
 
Discharge Core Measures
Meds if any: Prescribed or Continued at Discharge
Meds if any: NOT Prescribed or Continued at Discharge
 
Cerebrovascular accident
 
Inclusion Criteria
At DC or during hospital stay patient has or had the following:
CVA/TIA Diagnosis No
 
Discharge Core Measures
Meds if any: Prescribed or Continued at Discharge
Meds if any: NOT Prescribed or Continued at Discharge
 
Venous thromboembolism
 
Discharge Core Measures
- Per Current guidelines, there needs to be overlap
- treatment for the first 5 days of Warfarin therapy.
- If discharged on Warfarin prior to 5 days of
- overlap therapy, the patient will need to be
- assessed for post discharge needs including
- *Post discharge parental anticoagulation
- *Warfarin and/or parental anticoagulation education
- *Follow up date to check INR post discharge
Meds if any: Prescribed or Continued at Discharge
Note: Overlap Therapy is Warfarin and Anticoagulant
Meds if any: NOT Prescribed or Continued at Discharge

## 2018-04-13 NOTE — ECHOCARDIOGRAM REPORT
IMAN AL 
 
 Age:    77     :    1940      Gender:     F 
 
 MRN:    [id number] 
 
 Exam Date:     2018  
                15:18 
 
 Exam Location:   
 [place] 
 
 Ht (in):     68      Wt (lb):      173     BSA:    1.95 
 
 BP:          158     /     72 
 
 Ordering Physician:        Carolin Alan MD 
 
 Referring Physician:       Carolin Alan MD 
 
 Technologist:              [facility] 
 
 Room Number:               185-2 
 
 Indications:       Arrhythmia 
 
 Rhythm: 
 
 Technical Quality:      Technically difficult study 
 
 FINDINGS 
 
 Left Ventricle 
 Left ventricular cavity size normal. No obvious regional wall motion  
 abnormalities. Left ventricular wall thickness mildly increased.  
 Left ventricular ejection fraction is estimated at > 55  %. 
 
 Right Ventricle 
 Right ventricle not well visualized, grossly normal. 
 
 Right Atrium 
 Right atrium not well visualized, grossly normal. 
 
 Left Atrium 
 Left atrium not well visualized, grossly normal. 
 
 Mitral Valve 
 Mitral valve not well visualized, grossly normal. No mitral  
 stenosis. Trace mitral regurgitation. 
 
 Aortic Valve 
 No aortic stenosis. Trileaflet aortic valve. 
 
 Tricuspid Valve 
 Tricuspid valve not well visualized, grossly normal. No tricuspid  
 stenosis. Trace to mild tricuspid regurgitation. Unable to estimate  
 the right ventricular systolic pressure. 
 
 Pulmonic Valve 
 Pulmonic valve not well visualized. 
 
 Pericardium 
 No pericardial effusion. 
 
 Great Vessels 
 Normal size aortic root. 
 
 CONCLUSIONS 
 Technically difficult study.  
 Left ventricular cavity size normal. No obvious regional wall motion  
 abnormalities. Left ventricular wall thickness mildly increased.  
 Left ventricular ejection fraction is estimated at > 55  %.  
 Right ventricle not well visualized, grossly normal.  
 No pericardial effusion.  
 
 Travon Tijerina M.D. 
 (Electronically Signed) 
 Final Date:      2018  
                  21:21 
 
 MEASUREMENTS  (Male / Female) Normal Values 
 
 2D ECHO 
 LV Diastolic Diameter PLAX        3.8 cm                4.2 - 5.9 / 3.9 - 5.3 
cm 
 LV Systolic Diameter PLAX         2.8 cm                2.1 - 4.0 cm 
 LV Fractional Shortening PLAX     26.3 %                25 - 46  % 
 LV Ejection Fraction 2D Teich     52.3 %                 
 IVS Diastolic Thickness           1.6 cm                 
 LVPW Diastolic Thickness          1.4 cm                 
 LV Relative Wall Thickness        0.8                    
 LA Systolic Diameter LX           3.3 cm                3.0 - 4.0 / 2.7 - 3.8 
cm 
 Ascending Aorta Diameter          3.8 cm                 
 
 DOPPLER 
 TR Peak Velocity                  262.0 cm/s             
 TR Peak Gradient                  27.5 mmHg              
 PV Peak Velocity                  107.0 cm/s             
 PV Peak Gradient                  4.6 mmHg

## 2018-04-13 NOTE — RADIOLOGY REPORT
EXAMINATION:\H\
\N\XR CHEST
 
CLINICAL INFORMATION:
Left pacemaker placement.
 
COMPARISON:
Chest done on 04/11/2018.
 
TECHNIQUE:
Single spot radiographs of the lower part of the chest centering over the
mediastinum was obtained.
 
FINDINGS:
Full procedural detail will be dictated by Dr. Almaguer the performing
surgeon. On this single spot radiograph, there are 2 radiopaque wires seen
projecting over the right lower cardiac outline.
 
IMPRESSION:
Fluoroscopy assistance was provided at the time of pacemaker placement.
Please refer to the operative notes by Dr. Almaguer for further full details.

## 2018-04-13 NOTE — PN- HOUSESTAFF
Kavon Khan 18 1518:
Subjective
Follow-up For:
Symptomatic bradycardia
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
Tele-Events Since Last Visit:
2:1 AV block, rate 33-37.
Subjective:
The patient was seen and examined.  She underwent pacemaker placement without 
any complications in the morning.  She mentions that her symptoms of palpitation
and lightheadedness have already resolved.  She denies any chest pain, headache,
dizziness, lightheadedness, nausea, vomiting, abdominal pain.
 
Review of Systems
Constitutional:
Reports: no symptoms. 
 
Objective
Last 24 Hrs of Vital Signs/I&O
 Vital Signs
 
 
Date Time Temp Pulse Resp B/P B/P Pulse O2 O2 Flow FiO2
 
     Mean Ox Delivery Rate 
 
 0653 98.7 33 20 158/72  94 Room Air  
 
 2224 98.4 32 20 136/76  94   
 
 2043 98.2 36 20 142/84  96   
 
 
 Intake & Output
 
 
  1600  0800  0000
 
Intake Total 580  
 
Output Total 600  500
 
Balance -20  -500
 
    
 
Intake,   
 
Intake, Oral 480  
 
Output, Urine 600  500
 
Patient   183 lb
 
Weight   
 
 
 
 
Physical Exam
General Appearance: Alert, Oriented X3, Cooperative, No Acute Distress
Other Physical Findings:
Skin: No Rashes
HEENT: Atraumatic, PERRLA, EOMI, Mucous Membr. moist/pink
Neck: Supple
Cardiovascular: S1-S2 auscultated, normal rate, no murmurs.
Lungs: Clear to Auscultation, Normal Air Movement
Abdomen: Normal Bowel Sounds, Soft, No Tenderness, No Hepatospenomegaly, No 
Masses
Neurological: Normal Speech, Strength at 5/5 X4 Ext, Normal Tone, Sensation 
Intact, Cranial Nerves 3-12 NL
Extremities: No Clubbing, No Cyanosis, No Edema, Normal Pulses, No Tenderness/
Swelling
Current Medications:
 Current Medications
 
 
  Sig/Sahara Start time  Last
 
Medication Dose Route Stop Time Status Admin
 
Acetaminophen 1,000 MG Q6H  1000 AC 
 
N/A 1 UNIT IV  0414  1617
 
Acetaminophen 650 MG Q6P PRN  2315 DC 
 
  PO   
 
Atorvastatin Calcium 40 MG 1700  1700 AC 
 
  PO   1617
 
Cefazolin Sodium 2,000 MG IQ8  1600 CAN 
 
  IV  0001  
 
Cefazolin Sodium 2 GM IQ8  1600 AC 
 
N/A 1 UNIT IV  0029  1632
 
Cholecalciferol 1,000 IU DAILY  1045 AC 
 
  PO   1123
 
Doxycycline Hyclate 100 MG BID  2100 AC 
 
  PO   1123
 
Enoxaparin Sodium 40 MG DAILY  0900 AC 
 
  SC   
 
Fentanyl Citrate 100 MCG .STK-MED ONE  0710 DC 
 
  IM  0711  
 
Folic Acid 1 MG DAILY  1045 AC 
 
  PO   1123
 
Hydroxychloroquine  200 MG BID  1046 AC 
 
Sulfate  PO   1123
 
Levothyroxine Sodium 0.025 MG DAILY AC  0730 AC 
 
  PO   0547
 
Methotrexate 20 MG QTUES  0700 AC 
 
  PO   
 
Midazolam HCl 2 MG .STK-MED ONE  0710 DC 
 
  IM  0711  
 
Morphine Sulfate 4 MG .STK-MED ONE  1008 DC 
 
  IM  1009  
 
Multivitamins 1 TAB DAILY  1047 AC 
 
  PO   1123
 
Oxycodone HCl 5 MG Q4-6 PRN PRN  1000 AC 
 
  PO   
 
Oxycodone HCl 10 MG Q4-6 PRN PRN  1000 AC 
 
  PO   
 
 
 
 
Last 24 Hrs of Lab/Dimas Results
Last 24 Hrs of Labs/Mics:
 Laboratory Tests
 
18 0720:
CBC w Diff NO MAN DIFF REQ, RBC 3.56  L, MCV 95.1, MCH 32.6  H, MCHC 34.2, RDW 
15.5  H, MPV 8.0, Gran % 61.1, Lymphocytes % 30.3, Monocytes % 5.9, Eosinophils 
% 2.2, Basophils % 0.5, Absolute Granulocytes 2.3, Absolute Lymphocytes 1.2, 
Absolute Monocytes 0.2, Absolute Eosinophils 0.1, Absolute Basophils 0
 
18 0040:
Troponin I < 0.01
 
18 1925:
Troponin I < 0.01
 
 
Assessment/Plan
Assessment:
This is a 77-year-old lady with past medical history significant for RA on 
hydroxychloroquine, recently diagnosed hypothyroidism, hyperlipidemia, who 
presented to the hospital with palpitation, shortness of breath, and dizziness.
 
Reports having history of tick bite 2 weeks ago on her left shoulder with a 
rash.
 
Problem list
* Symptomatic bradycardia, Mobitz type II block s/p dual-chamber MRI compatible 
pacemaker insertion.
* Mild thrombocytopeniastable
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 
hypothyroidism 
Plan
* Monitor on telemetry
* Serial trop and EKG negative
* Cardiology consult appreciated
* Echocardiogram pending
* Underwent pacemaker insertion this morning, postop chest x-ray negative for 
any pneumothorax medications.
* Lyme titer negative, PCR for anaplasma pending
* ID consult appreciated, continue with doxycycline
* Continue levothyroxine
* DVT prophylaxis at all times
Problem List:
 1. Chest pain
 
 2. Bradycardia
 
Pain Ratin
Pain Location:
NA
Pain Goal: Remain pain free
Pain Plan:
NA
Tomorrow's Labs & Rationales:
CBC to monitor H&H and PLT
 
 
Alexander Gerber 18 1522:
Attending MD Review Statement
 
Attending Statement
Attending MD Statement: examined this patient, discuss w/resident/PA/NP, agreed 
w/resident/PA/NP, discussed with family, reviewed EMR data (avail), discussed 
with nursing, discussed with case mgmt, reviewed images, amended to note
Attending Assessment/Plan:
77 o/f with pmh of hypothyroidism, rheumatoid arthritis, remote history of DVT/
PE, and hyperlipidemia who came shortness of breath and lightheadedness with 
atypical chest pain. Patient gives history of tick bite. 
 
Continue telemetry montioring for symptomatic bradycardia 2-1 AV block underwent
pacemaker placement by [facility]. Serial cardiac enzymes negative for MI. Cardiology 
and [facility] appreciated. ECHO f/u.
 
ID consult recommends PO doxy for ?lyme/anaplasma. f/u serology. f/u ID.  
 
anticipate dc planning as per ID and cardiology.

## 2018-04-13 NOTE — CONS- THORACIC SURGERY
General Information and HPI
 
Consulting Request
Date of Consult: 04/12/18
Requested By:
Alexander Gerber MD
 
Reason for Consult:
Symptomatic bradycardia with second-degree heart block
Source of Information: patient, old records, PCP
Exam Limitations: no limitations
History of Present Illness:
The patient is a 77-year-old woman who came into the emergency room with 
shortness of breath and lightheadedness.  While there she was found to be in 
second-degree heart block with ventricular heart rate in the 30s.  Cardiology 
evaluation has recommended permanent pacemaker placement for symptomatic 
bradycardia in the setting of a very high-grade heart block.  This is a cardiac 
surgical evaluation for placement of a permanent pacemaker.
 
Allergies/Medications
Allergies:
Coded Allergies:
celecoxib (From CELEBREX) (UNKNOWN 04/11/18)
rofecoxib (From VIOXX) (UNKNOWN 04/11/18)
 
Home Med List:
Cholecalciferol (Vitamin D3) (Vitamin D) 1,000 UNIT TABLET   1 TAB PO DAILY 
SUPPLEMENT  (Reported)
Folic Acid 1 MG TABLET   1 TAB PO DAILY SUPPLEMENT  (Reported)
Hydroxychloroquine Sulfate 200 MG TABLET   1 TAB PO BID RA/OA  (Reported)
Levothyroxine Sodium (Synthroid) 25 MCG TABLET   1 TAB PO DAILY THYROID  (
Reported)
Methotrexate 2.5 MG TABLET   8 TAB PO QTUES RA  (Reported)
Simvastatin (Simvastatin*) 40 MG TABLET   1 TAB PO QPM CHOLESTEROL  (Reported)
 
Current Medications:
 Current Medications
 
 
  Sig/Sahara Start time  Last
 
Medication Dose Route Stop Time Status Admin
 
Acetaminophen 1,000 MG Q6H 04/13 1000 AC 04/13
 
N/A 1 UNIT IV 04/14 0414  1130
 
Acetaminophen 650 MG Q6P PRN 04/11 2315 DC 
 
  PO   
 
Atorvastatin Calcium 40 MG 1700 04/12 1700 AC 04/12
 
  PO   1914
 
Cefazolin Sodium 2,000 MG IQ8 04/13 1600 CAN 
 
  IV 04/14 0001  
 
Cefazolin Sodium 2 GM IQ8 04/13 1600 AC 
 
N/A 1 UNIT IV 04/14 0029  
 
Cholecalciferol 1,000 IU DAILY 04/12 1045 AC 04/13
 
  PO   1123
 
Doxycycline Hyclate 100 MG BID 04/12 2100 AC 04/13
 
  PO   1123
 
Enoxaparin Sodium 40 MG DAILY 04/14 0900 AC 
 
  SC   
 
Enoxaparin Sodium 40 MG DAILY@0900 04/12 0900 DC 04/12
 
  SC   0850
 
Folic Acid 1 MG DAILY 04/12 1045 AC 04/13
 
  PO   1123
 
Hydroxychloroquine  200 MG BID 04/12 1046 AC 04/13
 
Sulfate  PO   1123
 
Levothyroxine Sodium 0.025 MG DAILY AC 04/12 0730 AC 04/13
 
  PO   0547
 
Methotrexate 20 MG QTUES 04/17 0700 AC 
 
  PO   
 
Multivitamins 1 TAB DAILY 04/12 1047 AC 04/13
 
  PO   1123
 
Oxycodone HCl 5 MG Q4-6 PRN PRN 04/13 1000 AC 
 
  PO   
 
Oxycodone HCl 10 MG Q4-6 PRN PRN 04/13 1000 AC 
 
  PO   
 
 
 
 
Past History
 
Medical History
Cardiovascular: hyperlipidemia
Musculoskeletal: arthritis
Endocrine: hypothyroidism
Blood Disorders: DVT (1968), PE (1968)
 
Surgical History
Pertinent Surgical History: non-contributory
 
Psychosocial History
Where Do You Live? Home
Services at Home: None
Smoking Status: Never Smoked
 
Review of Systems
Review of Systems:
The patient has some general exertional dyspnea along with general weakness.  
She does feel lightheaded.  She said that 2 weeks ago there was a very brief 
loss of consciousness but she did not seek any medical care.  Her family says 
that she has had a lack of energy for upwards of the last 2 months.  She has 
some intermittent left-sided chest pain.  There is been no cough and no fevers 
night sweats or chills.  The rest for 12 point review of systems is 
unremarkable.
 
Exam & Diagnostic Data
Vital Signs and I&O
Vital Signs
 
 
Date Time Temp Pulse Resp B/P B/P Pulse O2 O2 Flow FiO2
 
     Mean Ox Delivery Rate 
 
04/13 0653 98.7 33 20 158/72  94 Room Air  
 
04/12 2224 98.4 32 20 136/76  94   
 
04/12 2043 98.2 36 20 142/84  96   
 
04/12 1756 98.6 37 18 160/80  96 Room Air  
 
04/12 1604 98.1 34 18 160/62  97 Room Air  
 
 
 Intake & Output
 
 
 04/13 1600 04/13 0800 04/13 0000 04/12 1600 04/12 0800 04/12 0000
 
Intake Total 580   240  
 
Output Total 600  500   
 
Balance -20  -500 240  
 
       
 
Intake,      
 
Intake, Oral 480   240  
 
Output, Urine 600  500   
 
Patient   183 lb 173 lb  173 lb
 
Weight      
 
Weight      Reported by Patient
 
Measurement      
 
Method      
 
 
 
Physical Exam:
On physical examination she appears well.  Her skin is warm and well-perfused 
with no suspicious lesions noted.  The sclerae are anicteric and mucous 
membranes are moist.  There is no cervical or subclavicular lymphadenopathy.  
Her breath sounds are clear bilaterally with no wheezes rhonchi noted.  Cardiac 
exam shows a bradycardia with a heart rate in the 30s.  There are no murmurs or 
extra sounds.  Her abdomen is soft and nontender with no masses.  The periphery 
shows no cyanosis clubbing or edema.  Her neurologic exam is grossly normal for 
motor and sensory function.
Last 24 Hours of Labs:
 Laboratory Tests
 
 
 04/13 04/13 04/12
 
 0720 0040 1925
 
Chemistry   
 
  Troponin I (< 0.11 ng/ml)  < 0.01 < 0.01
 
Hematology   
 
  CBC w Diff NO MAN DIFF REQ  
 
  WBC (4.8 - 10.8 /CUMM) 3.8  L  
 
  RBC (4.20 - 5.40 /CUMM) 3.56  L  
 
  Hgb (12.0 - 16.0 G/DL) 11.6  L  
 
  Hct (37 - 47 %) 33.8  L  
 
  MCV (81.0 - 99.0 FL) 95.1  
 
  MCH (27.0 - 31.0 PG) 32.6  H  
 
  MCHC (33.0 - 37.0 G/DL) 34.2  
 
  RDW (11.5 - 14.5 %) 15.5  H  
 
  Plt Count (130 - 400 /CUMM) 126  L  
 
  MPV (7.4 - 10.4 FL) 8.0  
 
  Gran % (42.2 - 75.2 %) 61.1  
 
  Lymphocytes % (20.5 - 51.1 %) 30.3  
 
  Monocytes % (1.7 - 9.3 %) 5.9  
 
  Eosinophils % (0 - 5 %) 2.2  
 
  Basophils % (0.0 - 2.0 %) 0.5  
 
  Absolute Granulocytes (1.4 - 6.5 /CUMM) 2.3  
 
  Absolute Lymphocytes (1.2 - 3.4 /CUMM) 1.2  
 
  Absolute Monocytes (0.10 - 0.60 /CUMM) 0.2  
 
  Absolute Eosinophils (0.0 - 0.7 /CUMM) 0.1  
 
  Absolute Basophils (0.0 - 0.2 /CUMM) 0  
 
 
 
Other Results:
The monitor shows a 2-1 heart block.
 
Assessment/Plan
Assessment/Plan
77-year-old woman with high-grade heart block symptomatic bradycardia and an 
episode of syncope 2 weeks ago.  There is a workup in place for tickborne 
disease and possible Lyme carditis.  Cardiology's opinion is that this is an 
indication for permanent pacemaker and I agree.  The risks and benefits of the 
procedure including bleeding infection pneumothorax and cardiac chamber 
perforation have been explained to the patient and she understands and agrees.  
We'll proceed on 04/13/2018 with placement of a dual-chamber MRI compatible 
pacemaker.
 
Copies To:
Breezy DYE,Travon
 
Consult Acknowledgment
- Thank you for your consult request.

## 2018-04-13 NOTE — PN- THORACIC SURGERY
Subjective
Subjective:
Postop check
 
Pt is s/p pacemaker placement. No acute events. She is resting comfortably and 
was not awaken for exam. 
 
Objective
Vital Signs and I&Os
Vital Signs
 
 
Date Time Temp Pulse Resp B/P B/P Pulse O2 O2 Flow FiO2
 
     Mean Ox Delivery Rate 
 
04/13 0653 98.7 33 20 158/72  94 Room Air  
 
 
 Intake & Output
 
 
 04/13 1600 04/13 0800 04/13 0000 04/12 1600 04/12 0800 04/12 0000
 
Intake Total 580   240  
 
Output Total 600  500   
 
Balance -20  -500 240  
 
       
 
Intake,      
 
Intake, Oral 480   240  
 
Output, Urine 600  500   
 
Patient   183 lb 173 lb  173 lb
 
Weight      
 
Weight      Reported by Patient
 
Measurement      
 
Method      
 
 
 
Physical Exam:
Gen: Pt is resting comfortably. 
Chest: L chest dressing is clean and intact. No significant surrounding 
ecchymosis noted. Sling in place on left arm. 
 
Assessment/Plan
Assessment/Plan
Pt is a 78 yo F, who is now s/p permanent pacemaker placement for Symptomatic 
bradycardia with second-degree heart block. She remains stable. Pressure 
dressing to stay in place. Continue sling to left arm to minimize mobilization.

## 2018-04-13 NOTE — PN- INFECT DX
Subjective
Subjective:
Afebrile.  She notes some discomfort at the surgical site in the left chest but 
has no further lightheadedness.
 
Objective
Last 24 Hrs of Vital Signs/I&O
 Vital Signs
 
 
Date Time Temp Pulse Resp B/P B/P Pulse O2 O2 Flow FiO2
 
     Mean Ox Delivery Rate 
 
04/13 0653 98.7 33 20 158/72  94 Room Air  
 
04/12 2224 98.4 32 20 136/76  94   
 
04/12 2043 98.2 36 20 142/84  96   
 
04/12 1756 98.6 37 18 160/80  96 Room Air  
 
04/12 1604 98.1 34 18 160/62  97 Room Air  
 
04/12 1400 98.5 62 20 160/70  97 Room Air  
 
04/12 1350 98.5 62 20 160/70  97 Room Air  
 
 
 Intake & Output
 
 
 04/13 1600 04/13 0800 04/13 0000
 
Intake Total   
 
Output Total   500
 
Balance   -500
 
    
 
Output, Urine   500
 
Patient   183 lb
 
Weight   
 
 
 
 
Physical Exam
Other Physical Findings:
She appears comfortable in no acute distress
Chest dressing in place in the left upper chest
Lungs are clear
Heart regular rhythm with no murmur
 
 
Results
Last 24 Hours of Lab Results:
 Laboratory Tests
 
 
 04/13 04/13 04/12
 
 0720 0040 1925
 
Chemistry   
 
  Troponin I (< 0.11 ng/ml)  < 0.01 < 0.01
 
Hematology   
 
  CBC w Diff NO MAN DIFF REQ  
 
  WBC (4.8 - 10.8 /CUMM) 3.8  L  
 
  RBC (4.20 - 5.40 /CUMM) 3.56  L  
 
  Hgb (12.0 - 16.0 G/DL) 11.6  L  
 
  Hct (37 - 47 %) 33.8  L  
 
  MCV (81.0 - 99.0 FL) 95.1  
 
  MCH (27.0 - 31.0 PG) 32.6  H  
 
  MCHC (33.0 - 37.0 G/DL) 34.2  
 
  RDW (11.5 - 14.5 %) 15.5  H  
 
  Plt Count (130 - 400 /CUMM) 126  L  
 
  MPV (7.4 - 10.4 FL) 8.0  
 
  Gran % (42.2 - 75.2 %) 61.1  
 
  Lymphocytes % (20.5 - 51.1 %) 30.3  
 
  Monocytes % (1.7 - 9.3 %) 5.9  
 
  Eosinophils % (0 - 5 %) 2.2  
 
  Basophils % (0.0 - 2.0 %) 0.5  
 
  Absolute Granulocytes (1.4 - 6.5 /CUMM) 2.3  
 
  Absolute Lymphocytes (1.2 - 3.4 /CUMM) 1.2  
 
  Absolute Monocytes (0.10 - 0.60 /CUMM) 0.2  
 
  Absolute Eosinophils (0.0 - 0.7 /CUMM) 0.1  
 
  Absolute Basophils (0.0 - 0.2 /CUMM) 0  
 
 
 
Last 24 Hours of Dimas Results:
Chest x-ray April 13 no pneumothorax status post placement of a pacemaker
 
 
Assessment/Plan ID
Impression:
Stable, with temperatures remaining normal, on Doxycycline Day 1 of treatment 
for possible Anaplasma, status post a tick bite 1 week prior to admission, with 
leukopenia and thrombocytopenia, possibly secondary to Anaplasma versus 
medications, as she is on both methotrexate and hydroxychloroquine for 
rheumatoid arthritis.  Her Lyme titer was negative making Lyme carditis 
unlikely.
 
Suggestion:
1.  Follow-up Anaplasma PCR 
2.  Continue to monitor white blood cell and platelet counts
3.  Continue Doxycycline pending above (would require a 10 day course if 
treating Anaplasma)
 
Dr. Suarez is covering over the weekend

## 2018-04-13 NOTE — RADIOLOGY REPORT
EXAMINATION:
XR PORTABLE CHEST
 
CLINICAL INFORMATION:
Status post dual-chamber placement.
 
COMPARISON:
Chest radiograph 04/11/2018.
 
TECHNIQUE:
Portable frontal view of the chest was obtained.
 
FINDINGS:
There is a left pectoral pacemaker in place with leads terminating within the
right atrium and right ventricle. There is mild hilar vascular congestion and
ill-defined opacities within lung bases that presumably represent a
manifestation of subsegmental atelectasis. There is no overt consolidative
disease, pleural effusion, or pneumothorax. The cardiac silhouette and upper
mediastinal contours are stable. No acute osseous finding.
 
IMPRESSION:
No evidence of pneumothorax status post placement of a dual-chamber left
pectoral pacemaker.

## 2018-04-14 VITALS — SYSTOLIC BLOOD PRESSURE: 124 MMHG | DIASTOLIC BLOOD PRESSURE: 62 MMHG

## 2018-04-14 VITALS — DIASTOLIC BLOOD PRESSURE: 70 MMHG | SYSTOLIC BLOOD PRESSURE: 132 MMHG

## 2018-04-14 VITALS — SYSTOLIC BLOOD PRESSURE: 122 MMHG | DIASTOLIC BLOOD PRESSURE: 86 MMHG

## 2018-04-14 LAB
ABSOLUTE GRANULOCYTE CT: 5.9 /CUMM (ref 1.4–6.5)
BASOPHILS # BLD: 0 /CUMM (ref 0–0.2)
BASOPHILS NFR BLD: 0.2 % (ref 0–2)
EOSINOPHIL # BLD: 0 /CUMM (ref 0–0.7)
EOSINOPHIL NFR BLD: 0.2 % (ref 0–5)
ERYTHROCYTE [DISTWIDTH] IN BLOOD BY AUTOMATED COUNT: 15.7 % (ref 11.5–14.5)
GRANULOCYTES NFR BLD: 81.4 % (ref 42.2–75.2)
HCT VFR BLD CALC: 33 % (ref 37–47)
LYMPHOCYTES # BLD: 1 /CUMM (ref 1.2–3.4)
MCH RBC QN AUTO: 32.4 PG (ref 27–31)
MCHC RBC AUTO-ENTMCNC: 34.3 G/DL (ref 33–37)
MCV RBC AUTO: 94.5 FL (ref 81–99)
MONOCYTES # BLD: 0.4 /CUMM (ref 0.1–0.6)
PLATELET # BLD: 138 /CUMM (ref 130–400)
PMV BLD AUTO: 8.7 FL (ref 7.4–10.4)
RED BLOOD CELL CT: 3.5 /CUMM (ref 4.2–5.4)
WBC # BLD AUTO: 7.3 /CUMM (ref 4.8–10.8)

## 2018-04-14 NOTE — PN- ATT ADDEND
Attending Addendum
Attending Brief Note
Reports being tired this am, wants to ambulate.  Otherwise denies CP or SOB
 
 Vital Signs
 
 
Date Time Temp Pulse Resp B/P B/P Pulse O2 O2 Flow FiO2
 
     Mean Ox Delivery Rate 
 
04/14 0630 98.7 66 20 132/70  93   
 
04/13 2300 98.3 68 20 140/78  98 Room Air  
 
 
 Intake & Output
 
 
 04/14 1600 04/14 0800 04/14 0000
 
Intake Total  320 120
 
Output Total   
 
Balance  320 120
 
    
 
Intake, IV  200 
 
Intake, Oral  120 120
 
Patient   82.554 kg
 
Weight   
 
 
GEN: AAOx3
HEENT: moist mucosa
LUNGS: CTAB
HEART: +PPM
EXT: + shoulder sling
 
A/P:  Ms. Romo is a 76 yo lady with PMHx:  hypothyroidism, rheumatoid 
arthritis, remote history of DVT/PE, and hyperlipidemia who came shortness of 
breath and lightheadedness with atypical chest pain was found to have 
symptomatic bradycarida and AV block.  Underwent PPM placement
-- cont to monitor
-- likely d/c in next 24 hrs

## 2018-04-15 VITALS — SYSTOLIC BLOOD PRESSURE: 158 MMHG | DIASTOLIC BLOOD PRESSURE: 80 MMHG

## 2018-04-15 VITALS — SYSTOLIC BLOOD PRESSURE: 132 MMHG | DIASTOLIC BLOOD PRESSURE: 84 MMHG

## 2018-04-15 VITALS — SYSTOLIC BLOOD PRESSURE: 122 MMHG | DIASTOLIC BLOOD PRESSURE: 66 MMHG

## 2018-04-15 LAB
ABSOLUTE GRANULOCYTE CT: 4.1 /CUMM (ref 1.4–6.5)
BASOPHILS # BLD: 0 /CUMM (ref 0–0.2)
BASOPHILS NFR BLD: 0.3 % (ref 0–2)
EOSINOPHIL # BLD: 0.2 /CUMM (ref 0–0.7)
EOSINOPHIL NFR BLD: 2.5 % (ref 0–5)
ERYTHROCYTE [DISTWIDTH] IN BLOOD BY AUTOMATED COUNT: 15.6 % (ref 11.5–14.5)
GRANULOCYTES NFR BLD: 59.6 % (ref 42.2–75.2)
HCT VFR BLD CALC: 34.6 % (ref 37–47)
LYMPHOCYTES # BLD: 2.2 /CUMM (ref 1.2–3.4)
MCH RBC QN AUTO: 32.5 PG (ref 27–31)
MCHC RBC AUTO-ENTMCNC: 34.1 G/DL (ref 33–37)
MCV RBC AUTO: 95.2 FL (ref 81–99)
MONOCYTES # BLD: 0.4 /CUMM (ref 0.1–0.6)
PLATELET # BLD: 149 /CUMM (ref 130–400)
PMV BLD AUTO: 7.9 FL (ref 7.4–10.4)
RED BLOOD CELL CT: 3.63 /CUMM (ref 4.2–5.4)
WBC # BLD AUTO: 6.9 /CUMM (ref 4.8–10.8)

## 2018-04-15 NOTE — RADIOLOGY REPORT
EXAMINATION:
XR PORTABLE CHEST
 
CLINICAL INFORMATION:
Chest pain.
 
COMPARISON:
Chest done on 04/13/2018.
 
TECHNIQUE:
Portable frontal view of the chest was obtained.
 
FINDINGS:
Left prepectoral dual-lead pacer device is present. The leads are intact.
Both lungs are symmetrically expanded and are clear. There is no evidence of
any left-sided hemopneumothorax present. The cardiac and mediastinal
silhouette is remarkable for undulated tortuous thoracic aorta.
 
The visualized upper abdomen is unremarkable.
 
IMPRESSION:
Intact left prepectoral dual-lead pacer device. No radiographic evidence of
acute cardiopulmonary disease.

## 2018-04-15 NOTE — PN- HOUSESTAFF
Aldo Mueller 04/15/18 0840:
Subjective
Follow-up For:
Symptomatic bradycardia
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
Tele-Events Since Last Visit:
single pacing with HR 60s.
 
Subjective:
Patient was seen and examined today.  Patient alert, awake, oriented.  Overnight
the patient started to complain off left-sided right sharp chest pain EKG and 
troponin ordered was negative, patient still currently complaining from the 
chest pain that is not related to breathing or movement, continuous improved 
with the pain medication per Patient.  She is currently in room air with oxygen 
saturation more than 92%, afebrile, heart rate 60s, blood pressure 158/80. 
 
Review of Systems
Constitutional:
Denies: chills, fever, weakness. 
Cardiovascular:
Reports: chest pain.  Denies: palpitations, peripheral edema. 
Respiratory:
Denies: cough, short of breath, wheezing. 
Gastrointestinal:
Denies: abdominal pain, constipation, diarrhea, nausea, vomiting. 
Genitourinary:
Denies: hematuria, pain. 
 
Objective
Last 24 Hrs of Vital Signs/I&O
 Vital Signs
 
 
Date Time Temp Pulse Resp B/P B/P Pulse O2 O2 Flow FiO2
 
     Mean Ox Delivery Rate 
 
04/15 0649 98.0 65 20 158/80  95   
 
 2217 97.2 69 18 122/86  95   
 
 1430 97.7 68 20 124/62  95 Room Air  
 
 
 Intake & Output
 
 
 04/15 1600 04/15 0800 04/15 0000
 
Intake Total  200 240
 
Output Total   
 
Balance  200 240
 
    
 
Intake, Oral  200 240
 
Patient   176 lb
 
Weight   
 
 
 
 
Physical Exam
General Appearance: Alert, Oriented X3, Cooperative
HEENT: Atraumatic, PERRLA, EOMI
Neck: Supple
Cardiovascular: Regular Rate, Normal S1, Normal S2, Lt. side dressing noted. , 
No chest pain with palpation
Lungs: Clear to Auscultation, Normal Air Movement
Abdomen: Normal Bowel Sounds, Soft, No Tenderness
Extremities: No Edema, Normal Pulses
Current Medications:
 Current Medications
 
 
  Sig/Sahara Start time  Last
 
Medication Dose Route Stop Time Status Admin
 
Acetaminophen 500 MG ONCE ONE 04/15 0500 DC 04/15
 
  PO 04/15 0501  0453
 
Atorvastatin Calcium 40 MG 1700  1700 AC 
 
  PO   1556
 
Cholecalciferol 1,000 IU DAILY  1045 AC 04/15
 
  PO   0830
 
Doxycycline Hyclate 100 MG BID  2100 AC 04/15
 
  PO   0830
 
Enoxaparin Sodium 40 MG DAILY  0900 AC 04/15
 
  SC   0831
 
Folic Acid 1 MG DAILY  1045 AC 04/15
 
  PO   0830
 
Hydroxychloroquine  200 MG BID  1046 AC 04/15
 
Sulfate  PO   0830
 
Levothyroxine Sodium 0.025 MG DAILY AC  0730 AC 04/15
 
  PO   0454
 
Methotrexate 20 MG QTUES  0700 AC 
 
  PO   
 
Multivitamins 1 TAB DAILY  1047 AC 04/15
 
  PO   0830
 
Oxycodone HCl 5 MG Q4-6 PRN PRN  1000 AC 04/15
 
  PO   0930
 
Oxycodone HCl 10 MG Q4-6 PRN PRN  1000 AC 
 
  PO   
 
 
 
 
Last 24 Hrs of Lab/Dimas Results
Last 24 Hrs of Labs/Mics:
 Laboratory Tests
 
04/15/18 1105:
Troponin I Pending
 
04/15/18 0510:
Troponin I 0.02, CBC w Diff NO MAN DIFF REQ, RBC 3.63  L, MCV 95.2, MCH 32.5  H,
MCHC 34.1, RDW 15.6  H, MPV 7.9, Gran % 59.6, Lymphocytes % 31.7, Monocytes % 
5.9, Eosinophils % 2.5, Basophils % 0.3, Absolute Granulocytes 4.1, Absolute 
Lymphocytes 2.2, Absolute Monocytes 0.4, Absolute Eosinophils 0.2, Absolute 
Basophils 0
 
 
Orders
ECHO Findings:
EXAM TYPE: CARD - ECHOCARDIOGRAM
 
 
 
 
 
 
 
 
 
 IMAN AL 
 
 Age:    77     :    1940      Gender:     F 
 
 MRN:    [id number] 
 
 Exam Date:     2018  
                15:18 
 
 Exam Location: [address] [place] [address] 
 
 Ht (in):     68      Wt (lb):      173     BSA:    1.95 
 
 BP:          158     /     72 
 
 Ordering Physician:        Carolin Alan MD 
 
 Referring Physician:       Carolin Alan MD 
 
 Technologist:              Austen White [facility] 
 
 Room Number:               185-2 
 
 Indications:       Arrhythmia 
 
 Rhythm: 
 
 Technical Quality:      Technically difficult study 
 
 FINDINGS 
 
 Left Ventricle 
 Left ventricular cavity size normal. No obvious regional wall motion  
 abnormalities. Left ventricular wall thickness mildly increased.  
 Left ventricular ejection fraction is estimated at > 55  %. 
 
 Right Ventricle 
 Right ventricle not well visualized, grossly normal. 
 
 Right Atrium 
 Right atrium not well visualized, grossly normal. 
 
 Left Atrium 
 Left atrium not well visualized, grossly normal. 
 
 Mitral Valve 
 Mitral valve not well visualized, grossly normal. No mitral  
 stenosis. Trace mitral regurgitation. 
 
 Aortic Valve 
 No aortic stenosis. Trileaflet aortic valve. 
 
 Tricuspid Valve 
 Tricuspid valve not well visualized, grossly normal. No tricuspid  
 stenosis. Trace to mild tricuspid regurgitation. Unable to estimate  
 the right ventricular systolic pressure. 
 
 Pulmonic Valve 
 Pulmonic valve not well visualized. 
 
 Pericardium 
 No pericardial effusion. 
 
 Great Vessels 
 Normal size aortic root. 
 
 CONCLUSIONS 
 Technically difficult study.  
 Left ventricular cavity size normal. No obvious regional wall motion  
 abnormalities. Left ventricular wall thickness mildly increased.  
 Left ventricular ejection fraction is estimated at > 55  %.  
 Right ventricle not well visualized, grossly normal.  
 
Assessment/Plan
Assessment:
This is a 77-year-old lady with past medical history significant for RA on 
hydroxychloroquine, recently diagnosed hypothyroidism, hyperlipidemia, who 
presented to the hospital with palpitation, shortness of breath, and dizziness.
 
Problem list
* Symptomatic bradycardia, Mobitz type II block s/p dual-chamber MRI compatible 
pacemaker insertion.
* Mild thrombocytopeniastable
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 
hypothyroidism 
 
Plan
* Monitor on telemetry
* Due to chest pain chest x-ray repeated came back negative on anymore 
pneumothorax.
* Troponin rising up at 0.06, we will trend his trop with EKG and informed 
cardiologist for further evaluation and recommendation. 
* Lyme titer negative, PCR for anaplasma pending
* ID consult appreciated, continue with doxycycline
* Continue levothyroxine
* DVT prophylaxis at all times
Problem List:
 1. Chest pain
 
 2. Bradycardia
 
Pain Ratin
Pain Location:
chest 
Pain Goal: Pain 4 or less
Pain Plan:
same 
Tomorrow's Labs & Rationales:
cbc
bep
 
 
Tri DYE,Amir 04/15/18 1035:
Attending MD Review Statement
 
Attending Statement
Attending MD Statement: examined this patient, discuss w/resident/PA/NP, agreed 
w/resident/PA/NP, discussed with family, reviewed EMR data (avail), discussed 
with nursing
Attending Assessment/Plan:
Pt reports c/o sharp pain at the site of pace maker placement.  Initial Trop 
negative.  Denies SOB
--will check one more trop level
--if neg can d/c home
--pt to f/u with cards as outpt
--rest of the plan as per resident's note

## 2018-04-16 VITALS — SYSTOLIC BLOOD PRESSURE: 150 MMHG | DIASTOLIC BLOOD PRESSURE: 70 MMHG

## 2018-04-16 LAB
ABSOLUTE GRANULOCYTE CT: 3.3 /CUMM (ref 1.4–6.5)
BASOPHILS # BLD: 0 /CUMM (ref 0–0.2)
BASOPHILS NFR BLD: 0.4 % (ref 0–2)
EOSINOPHIL # BLD: 0.1 /CUMM (ref 0–0.7)
EOSINOPHIL NFR BLD: 2.4 % (ref 0–5)
ERYTHROCYTE [DISTWIDTH] IN BLOOD BY AUTOMATED COUNT: 15.5 % (ref 11.5–14.5)
GRANULOCYTES NFR BLD: 66.2 % (ref 42.2–75.2)
HCT VFR BLD CALC: 32.6 % (ref 37–47)
LYMPHOCYTES # BLD: 1.2 /CUMM (ref 1.2–3.4)
MCH RBC QN AUTO: 32.6 PG (ref 27–31)
MCHC RBC AUTO-ENTMCNC: 34.3 G/DL (ref 33–37)
MCV RBC AUTO: 95 FL (ref 81–99)
MONOCYTES # BLD: 0.3 /CUMM (ref 0.1–0.6)
PLATELET # BLD: 140 /CUMM (ref 130–400)
PMV BLD AUTO: 7.8 FL (ref 7.4–10.4)
RED BLOOD CELL CT: 3.43 /CUMM (ref 4.2–5.4)
WBC # BLD AUTO: 5 /CUMM (ref 4.8–10.8)

## 2018-04-16 NOTE — PN- INFECT DX
Subjective
Subjective:
Afebrile.  She has complained of sharp left-sided chest pain over the last 
several days, with no cough or shortness of breath.  She has no pain at the 
pacemaker site.  She also reports an episode of vomiting earlier today.  She has
had no further episodes of lightheadedness.
 
Objective
Last 24 Hrs of Vital Signs/I&O
 Vital Signs
 
 
Date Time Temp Pulse Resp B/P B/P Pulse O2 O2 Flow FiO2
 
     Mean Ox Delivery Rate 
 
04/16 0715 97.8 72 16 150/70  93 Room Air  
 
04/15 2303 98.3 73 22 132/84  93   
 
04/15 1454 98.3 76 20 122/66  96 Room Air  
 
 
 Intake & Output
 
 
 04/16 1600 04/16 0800 04/16 0000
 
Intake Total  100 150
 
Output Total   
 
Balance  100 150
 
    
 
Intake, IV   10
 
Intake, Oral  100 140
 
Patient   175 lb
 
Weight   
 
 
 
 
Physical Exam
Other Physical Findings:
She appears comfortable in no acute distress
Chest dressing intact over the pacemaker site; no tenderness over the left chest
at the site of her pain
Lungs are clear
Heart regular rhythm with no murmur
Extremities no cyanosis, clubbing or edema
 
 
Results
Last 24 Hours of Lab Results:
 Laboratory Tests
 
 
 04/16 04/15
 
 0655 1705
 
Chemistry  
 
  Sodium (137 - 145 mmol/L) 145 
 
  Potassium (3.5 - 5.1 mmol/L) 4.1 
 
  Chloride (98 - 107 mmol/L) 107 
 
  Carbon Dioxide (22 - 30 mmol/L) 26 
 
  Anion Gap (5 - 16) 11 
 
  BUN (7 - 17 mg/dL) 15 
 
  Creatinine (0.5 - 1.0 mg/dL) 0.8 
 
  Estimated GFR (>60 ml/min) > 60 
 
  BUN/Creatinine Ratio (7 - 25 %) 18.8 
 
  Troponin I (< 0.11 ng/ml)  0.05
 
Hematology  
 
  CBC w Diff NO MAN DIFF REQ 
 
  WBC (4.8 - 10.8 /CUMM) 5.0 
 
  RBC (4.20 - 5.40 /CUMM) 3.43  L 
 
  Hgb (12.0 - 16.0 G/DL) 11.2  L 
 
  Hct (37 - 47 %) 32.6  L 
 
  MCV (81.0 - 99.0 FL) 95.0 
 
  MCH (27.0 - 31.0 PG) 32.6  H 
 
  MCHC (33.0 - 37.0 G/DL) 34.3 
 
  RDW (11.5 - 14.5 %) 15.5  H 
 
  Plt Count (130 - 400 /CUMM) 140 
 
  MPV (7.4 - 10.4 FL) 7.8 
 
  Gran % (42.2 - 75.2 %) 66.2 
 
  Lymphocytes % (20.5 - 51.1 %) 24.7 
 
  Monocytes % (1.7 - 9.3 %) 6.3 
 
  Eosinophils % (0 - 5 %) 2.4 
 
  Basophils % (0.0 - 2.0 %) 0.4 
 
  Absolute Granulocytes (1.4 - 6.5 /CUMM) 3.3 
 
  Absolute Lymphocytes (1.2 - 3.4 /CUMM) 1.2 
 
  Absolute Monocytes (0.10 - 0.60 /CUMM) 0.3 
 
  Absolute Eosinophils (0.0 - 0.7 /CUMM) 0.1 
 
  Absolute Basophils (0.0 - 0.2 /CUMM) 0 
 
 
Serum Anaplasma PCR negative
 
Last 24 Hours of Dimas Results:
No cultures
 
Recent Imaging Studies:
Chest x-ray April 15 reveals clear lungs
 
 
Assessment/Plan ID
Impression:
Stable, with temperatures and white blood cell count normal, on Doxycycline Day 
4 of treatment for possible Anaplasma, with the PCR negative, making this 
unlikely.  Her white blood cell count and platelet count, which were low, have 
normalized, with no clear explanation, though they have been noted to be low on 
previous occasions, suggesting a chronic process.  The etiology of her recent 
chest pain is unclear.
 
Suggestion:
1.  Cardiology/CT surgery follow-up regarding her chest pain
2.  Discontinue Doxycycline and follow off antibiotics

## 2018-04-16 NOTE — PN- CARDIOLOGY
Subjective
Subjective:
 
Patient was complaining of some sharp chest discomfort post permanent pacemaker 
which has resolved. Denies any shortness of breath at this time.
 
Objective
Vital Signs and I&Os
Vital Signs
 
 
Date Time Temp Pulse Resp B/P B/P Pulse O2 O2 Flow FiO2
 
     Mean Ox Delivery Rate 
 
04/16 0715 97.8 72 16 150/70  93 Room Air  
 
04/15 2303 98.3 73 22 132/84  93   
 
 
 Intake & Output
 
 
 04/16 1600 04/16 0800 04/16 0000 04/15 1600 04/15 0800 04/15 0000
 
Intake Total  100 150 480 200 240
 
Output Total      
 
Balance  100 150 480 200 240
 
       
 
Intake, IV   10   
 
Intake, Oral  100 140 480 200 240
 
Patient   175 lb   176 lb
 
Weight      
 
 
 
Physical Exam:
 
General: no apparent distress. Alert.
Eyes: No obvious scleral icterus.
HEENT: No jugular venous distention or abnormal jugular venous pulsations.
Cardiovascular: Normal intensity S1/S2.  Left sided pressure dressing/PPM. 
Respiratory: Lungs clear to auscultation bilaterally.
Abdomen: Soft, nontender with no guarding or rebound tenderness.
Musculoskeletal: No clubbing or cyanosis noted
Skin: No obvious rashes or ulcerations.
Neurologic: No gross focal deficits noted.
Current Medications:
 Current Medications
 
 
  Sig/Sahara Start time  Last
 
Medication Dose Route Stop Time Status Admin
 
Atorvastatin Calcium 40 MG 1700 04/12 1700 DCD 04/15
 
  PO   1706
 
Cholecalciferol 1,000 IU DAILY 04/12 1045 DCD 04/16
 
  PO   0929
 
Doxycycline Hyclate 100 MG BID 04/12 2100 DC 04/16
 
  PO   0929
 
Enoxaparin Sodium 40 MG DAILY 04/14 0900 DCD 04/16
 
  SC   0928
 
Folic Acid 1 MG DAILY 04/12 1045 DCD 04/16
 
  PO   0928
 
Hydroxychloroquine  200 MG BID 04/12 1046 DCD 04/16
 
Sulfate  PO   0928
 
Levothyroxine Sodium 0.025 MG DAILY AC 04/12 0730 DCD 04/16
 
  PO   0610
 
Methotrexate 20 MG QTUES 04/17 0700 DCD 
 
  PO   
 
Multivitamins 1 TAB DAILY 04/12 1047 DCD 04/16
 
  PO   0929
 
Ondansetron HCl 4 MG ONCE ONE 04/16 0400 DC 04/16
 
  IV 04/16 0401  0408
 
Oxycodone HCl 5 MG Q4-6 PRN PRN 04/13 1000 DCD 04/15
 
  PO   2113
 
Oxycodone HCl 10 MG Q4-6 PRN PRN 04/13 1000 DCD 
 
  PO   
 
 
 
 
Results
Last 48 Hrs of Labs/Mics:
 Laboratory Tests
 
04/16/18 0655:
Anion Gap 11, Estimated GFR > 60, BUN/Creatinine Ratio 18.8, CBC w Diff NO MAN 
DIFF REQ, RBC 3.43  L, MCV 95.0, MCH 32.6  H, MCHC 34.3, RDW 15.5  H, MPV 7.8, 
Gran % 66.2, Lymphocytes % 24.7, Monocytes % 6.3, Eosinophils % 2.4, Basophils %
0.4, Absolute Granulocytes 3.3, Absolute Lymphocytes 1.2, Absolute Monocytes 0.3
, Absolute Eosinophils 0.1, Absolute Basophils 0
 
04/15/18 1705:
Troponin I 0.05
 
04/15/18 1105:
Troponin I 0.06
 
04/15/18 0510:
Troponin I 0.02, CBC w Diff NO MAN DIFF REQ, RBC 3.63  L, MCV 95.2, MCH 32.5  H,
MCHC 34.1, RDW 15.6  H, MPV 7.9, Gran % 59.6, Lymphocytes % 31.7, Monocytes % 
5.9, Eosinophils % 2.5, Basophils % 0.3, Absolute Granulocytes 4.1, Absolute 
Lymphocytes 2.2, Absolute Monocytes 0.4, Absolute Eosinophils 0.2, Absolute 
Basophils 0
 
Recent Imaging Studies:
 
Telemetry personally reviewed and shows paced rhythm 
 
Echo: 
 Technically difficult study.  
 Left ventricular cavity size normal. No obvious regional wall motion  
 abnormalities. Left ventricular wall thickness mildly increased.  
 Left ventricular ejection fraction is estimated at > 55  %.  
 Right ventricle not well visualized, grossly normal.  
 No pericardial effusion.  
 
 Travon Tijerina M.D. 
 (Electronically Signed) 
 Final Date:      13 April 2018  
                  21:21 
 
 
CXR:
Intact left prepectoral dual-lead pacer device. No radiographic evidence of
acute cardiopulmonary disease.
 
Assessment/Plan
Assessment/Plan
 
1.  Symptomatic bradycardia with 2-1 AV block now status post dual-chamber 
pacemaker
2.  History of hypothyroidism
3.  Intermittent chest pain/shortness of breath
4.  History of rheumatoid arthritis
5.  History of hyperlipidemia
6.  Remote history of DVT/PE
 
Doing well. Echo as above with no RWMA. All troponins within normal limits. Post
procedure CXR as above with no PTX. Pacing noted on telemetry. Stable for 
discharge from cardiac standpoint, can follow-up in my office within 1 week. May
be candidate for outpatient nuclear stress testing in the future. 
 
 
Theodore Tijerina MD [facility]
Continue telemetry? No

## 2018-04-16 NOTE — PN- HOUSESTAFF
Kavon Khan 18 0756:
Subjective
Follow-up For:
Symptomatic bradycardia
Tele-Events Since Last Visit:
Paced rhythm, rate 60s-70s. No events. 
Subjective:
The patient was seen and examined. She denies any headache, dizziness, 
lightheadedness, nausea, vomiting, chest pain, SOB, abdominal pain.
 
Had episode of chest pain yesterday. VSS. Trop/ekg negative. 
 
Review of Systems
Constitutional:
Reports: no symptoms. 
 
Objective
Last 24 Hrs of Vital Signs/I&O
 Vital Signs
 
 
Date Time Temp Pulse Resp B/P B/P Pulse O2 O2 Flow FiO2
 
     Mean Ox Delivery Rate 
 
 0715 97.8 72 16 150/70  93 Room Air  
 
04/15 2303 98.3 73 22 132/84  93   
 
 
 Intake & Output
 
 
  1600  0800  0000
 
Intake Total  100 150
 
Output Total   
 
Balance  100 150
 
    
 
Intake, IV   10
 
Intake, Oral  100 140
 
Patient   175 lb
 
Weight   
 
 
 
 
Physical Exam
General Appearance: Alert, Oriented X3, Cooperative, No Acute Distress
Other Physical Findings:
Skin: No Rashes, no sign of infection at the site of pace maker insertion on 
left shoulder
HEENT: Atraumatic, PERRLA, EOMI, Mucous Membr. moist/pink
Neck: Supple
Cardiovascular: S1-S2 auscultated, normal rate, no murmurs.
Lungs: Clear to Auscultation, Normal Air Movement
Abdomen: Normal Bowel Sounds, Soft, No Tenderness, No Hepatospenomegaly, No 
Masses
Neurological: Normal Speech, Strength at 5/5 X4 Ext, Normal Tone, Sensation 
Intact, Cranial Nerves 3-12 NL
Extremities: No Clubbing, No Cyanosis, No Edema, Normal Pulses, No Tenderness/
Swelling
Current Medications:
 Current Medications
 
 
  Sig/Sahara Start time  Last
 
Medication Dose Route Stop Time Status Admin
 
Atorvastatin Calcium 40 MG 1700  1700 DCD 15
 
  PO   1706
 
Cholecalciferol 1,000 IU DAILY  1045 DCD 
 
  PO   0929
 
Doxycycline Hyclate 100 MG BID  2100 DC 
 
  PO   0929
 
Enoxaparin Sodium 40 MG DAILY  0900 DCD 
 
  SC   0928
 
Folic Acid 1 MG DAILY  1045 DCD 
 
  PO   0928
 
Hydroxychloroquine  200 MG BID  1046 DCD 
 
Sulfate  PO   0928
 
Levothyroxine Sodium 0.025 MG DAILY AC  0730 DCD 
 
  PO   0610
 
Methotrexate 20 MG QTUES  0700 DCD 
 
  PO   
 
Multivitamins 1 TAB DAILY  1047 DCD 
 
  PO   0929
 
Ondansetron HCl 4 MG ONCE ONE  0400 DC 
 
  IV  0401  0408
 
Oxycodone HCl 5 MG Q4-6 PRN PRN  1000 DCD 04/15
 
  PO   2113
 
Oxycodone HCl 10 MG Q4-6 PRN PRN  1000 DCD 
 
  PO   
 
 
 
 
Last 24 Hrs of Lab/Dimas Results
Last 24 Hrs of Labs/Mics:
 Laboratory Tests
 
18 0655:
Anion Gap 11, Estimated GFR > 60, BUN/Creatinine Ratio 18.8, CBC w Diff NO MAN 
DIFF REQ, RBC 3.43  L, MCV 95.0, MCH 32.6  H, MCHC 34.3, RDW 15.5  H, MPV 7.8, 
Gran % 66.2, Lymphocytes % 24.7, Monocytes % 6.3, Eosinophils % 2.4, Basophils %
0.4, Absolute Granulocytes 3.3, Absolute Lymphocytes 1.2, Absolute Monocytes 0.3
, Absolute Eosinophils 0.1, Absolute Basophils 0
 
 
Assessment/Plan
Assessment:
This is a 77-year-old lady with past medical history significant for RA on 
hydroxychloroquine, recently diagnosed hypothyroidism, hyperlipidemia, who 
presented to the hospital with palpitation, shortness of breath, and dizziness.
 
Reports having history of tick bite 2 weeks PTA on her left shoulder with a 
rash.
 
Problem list
* Symptomatic bradycardia, Mobitz type II block s/p dual-chamber MRI compatible 
pacemaker insertion.
* Mild thrombocytopeniaresolved
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 
hypothyroidism
Plan:
* Serial trop and EKG negative; chest pain has resolved
* Cardiology consult appreciated
* s/p pacemaker insertion this morning, postop chest x-ray negative for any 
pneumothorax medications.
* Lyme titer negative, PCR for anaplasma negative
* ID consult appreciated, will DC doxycycline
* Continue levothyroxine
* Stable to DC. 
* To follow with PCP, Cardo and CT surgery within one week of discharge. 
* DVT prophylaxis at all times
Problem List:
 1. Chest pain
 
 2. Bradycardia
 
Pain Ratin
Pain Location:
NA
Pain Goal: Remain pain free
Pain Plan:
NA
Tomorrow's Labs & Rationales:
SHIRA
 
 
Alexander Gerber 18 1206:
Attending MD Review Statement
 
Attending Statement
Attending MD Statement: examined this patient, discuss w/resident/PA/NP, agreed 
w/resident/PA/NP, discussed with family, reviewed EMR data (avail), discussed 
with nursing, discussed with case mgmt, reviewed images, amended to note
Attending Assessment/Plan:
Patient seen/examined bedside. No new complaints. NO hematoma, no infection at 
surgical site. Patient can be dsicharged if ok with cardiology and follow with 
outpatient cardiology and [facility]VS. ID consulted for doubtful lyme which recommended
discontinue of antibiotics. Vitals stable. Afebrile.

## 2018-04-17 ENCOUNTER — HOSPITAL ENCOUNTER (EMERGENCY)
Dept: HOSPITAL 68 - ERH | Age: 78
End: 2018-04-17
Payer: COMMERCIAL

## 2018-04-17 VITALS — DIASTOLIC BLOOD PRESSURE: 74 MMHG | SYSTOLIC BLOOD PRESSURE: 143 MMHG

## 2018-04-17 VITALS — BODY MASS INDEX: 26.83 KG/M2 | HEIGHT: 68 IN | WEIGHT: 177 LBS

## 2018-04-17 DIAGNOSIS — R04.0: Primary | ICD-10-CM

## 2018-04-17 DIAGNOSIS — H11.31: ICD-10-CM

## 2018-04-17 LAB
ABSOLUTE GRANULOCYTE CT: 3.5 /CUMM (ref 1.4–6.5)
APTT BLD: 33 SEC (ref 25–37)
BASOPHILS # BLD: 0 /CUMM (ref 0–0.2)
BASOPHILS NFR BLD: 0.3 % (ref 0–2)
EOSINOPHIL # BLD: 0.1 /CUMM (ref 0–0.7)
EOSINOPHIL NFR BLD: 2.5 % (ref 0–5)
ERYTHROCYTE [DISTWIDTH] IN BLOOD BY AUTOMATED COUNT: 15.9 % (ref 11.5–14.5)
GRANULOCYTES NFR BLD: 69.6 % (ref 42.2–75.2)
HCT VFR BLD CALC: 36.2 % (ref 37–47)
LYMPHOCYTES # BLD: 1.1 /CUMM (ref 1.2–3.4)
MCH RBC QN AUTO: 32.5 PG (ref 27–31)
MCHC RBC AUTO-ENTMCNC: 34.1 G/DL (ref 33–37)
MCV RBC AUTO: 95.4 FL (ref 81–99)
MONOCYTES # BLD: 0.3 /CUMM (ref 0.1–0.6)
PLATELET # BLD: 155 /CUMM (ref 130–400)
PMV BLD AUTO: 7.7 FL (ref 7.4–10.4)
PROTHROMBIN TIME: 12.4 SEC (ref 9.4–12.5)
RED BLOOD CELL CT: 3.79 /CUMM (ref 4.2–5.4)
WBC # BLD AUTO: 5 /CUMM (ref 4.8–10.8)

## 2018-04-17 NOTE — ED GENERAL ADULT
History of Present Illness
 
General
Chief Complaint: General Adult
Stated Complaint: NOSE BLEED/"BLOODY EYE" S/P PACEMAKER PLACEMENT FR
Source: patient, family, old records
Exam Limitations: no limitations
 
Vital Signs & Intake/Output
Vital Signs & Intake/Output
 Vital Signs
 
 
Date Time Temp Pulse Resp B/P B/P Pulse O2 O2 Flow FiO2
 
     Mean Ox Delivery Rate 
 
04/17 2158 98.1 65 17 143/74  97 Room Air  
 
04/17 2021 98.3 66 16 170/81  100 Room Air  
 
04/17 1717 97.2 72 18 128/68  97 Room Air  
 
 
 ED Intake and Output
 
 
 04/18 0000 04/17 1200
 
Intake Total  
 
Output Total  
 
Balance  
 
   
 
Patient 177 lb 
 
Weight  
 
 
 
Allergies
Coded Allergies:
celecoxib (From CELEBREX) (UNKNOWN 04/17/18)
rofecoxib (From VIOXX) (UNKNOWN 04/17/18)
 
Reconcile Medications
Cholecalciferol (Vitamin D3) (Vitamin D) 1,000 UNIT TABLET   1 TAB PO DAILY 
SUPPLEMENT  (Reported)
Folic Acid 1 MG TABLET   1 TAB PO DAILY SUPPLEMENT  (Reported)
Hydroxychloroquine Sulfate 200 MG TABLET   1 TAB PO BID RA/OA  (Reported)
Levothyroxine Sodium (Synthroid) 25 MCG TABLET   1 TAB PO DAILY THYROID
Methotrexate 2.5 MG TABLET   8 TAB PO QTUES RA  (Reported)
Simvastatin (Simvastatin*) 40 MG TABLET   1 TAB PO QPM CHOLESTEROL  (Reported)
 
Triage Note:
PT TO TRIAGE WITH A BLOODY NOSE, BLOOD TO RIGHT
SCLERA, AND HEADACHE STARTING TODAY
Triage Nurses Notes Reviewed? yes
Onset: Abrupt
Duration: minute(s): (5), better, constant, resolved prior to arrival
Timing: recent history
Injury Environment: home
Severity: mild
Severity Numbers: 4
No Modifying Factors: none
Associated Symptoms: denies
HPI:
76 yo F with PMH of pacemaker which was placed 4 days ago, hypothyroidism, 
hyperlipidemia, rheumatoid arthritis presents to the ER for evaluation after she
developed a left-sided epistaxis that resolved after holding pressure for 5 
minutes followed by a right subconjunctival hemorrhage
And a right frontal headache.  Symptoms have all resolved.  She is not currently
on any blood thinners or aspirin.  She denies recent trauma fall severe coughing
nausea vomiting.  She denies any redness warmth or pain at the site of her 
pacemaker insertion.  No fever no chills no rhinorrhea congestion.  She denies 
headache at this time no vision changes.  No change in her mental status per 
family no chest pain or shortness of breath
(Doyle Andrea)
 
Past History
 
Travel History
Traveled to [place] past 21 day No
 
Medical History
Any Pertinent Medical History? see below for history
Neurological: NONE
EENT: NONE
Cardiovascular: hyperlipidemia, BRADYCARDIA
Respiratory: NONE
Gastrointestinal: NONE
Hepatic: NONE
Renal: NONE
Musculoskeletal: RHEUMATOID arthritis
Psychiatric: NONE
Endocrine: hypothyroidism
Blood Disorders: DVT (1968), PE (1968)
Cancer(s): NONE
GYN/Reproductive: NONE
History of MRSA: No
History of VRE: No
History of CDIFF: No
 
Surgical History
Surgical History: non-contributory
 
Psychosocial History
Who do you live with Patient/Self
Services at Home None
What is your primary language English
Tobacco Use: Never used
ETOH Use: occasional use
Illicit Drug Use: denies illicit drug use
 
Family History
Hx Contributory? No
(Doyle Andrea)
 
Review of Systems
 
Review of Systems
Constitutional:
Reports: see HPI. 
Comments
Review of systems: See HPI, All other systems negative.
Constitutional, no chills no fever,
HEENT:  no sore throat no congestion, no ear pain
Cardiovascular: No chest pain
Skin:  no rashes, no change in skin
Respiratory: No dyspnea no cough no  sputum 
GI: No nausea no vomiting, no diarrhea
Muscle skeletal: No joint pain, no back pain
Neurologic:  headache
Heme/endocrine: No bruising 
 
(Doyle Andrea)
 
Physical Exam
 
Physical Exam
General Appearance: well developed/nourished, no apparent distress, alert, awake
, comfortable
Comments:
Well-developed well-nourished person in no acute distress
Head/Face: Atraumatic, no facial swelling
Eyes: PERRL, EOMI, some conjunctival hemorrhage noted to the right medial eye. 
No nystagmus
Ear:External auditory canal and Tympanic membranes clear, no erythema, no FB.
Nose: Normal inspection: No active bleeding there is a small amount of dry blood
noted to the left nostril no septal hematoma  
Throat: Moist mucous membranes.Pharynx normal.  No bleeding/exudate seen. No 
stridor/drooling or assymetry. No swelling or edema. 
Neck: Supple,  FROM
Back: Full range of motion
Cardiovascular: Regular rate and rhythms no murmur
Respiratory:  No respiratory distress.  Patient speaking in full complete 
sentences. Breath sounds clear to auscultation bilaterally: NO W/R/R
Extremity: No edema, full range of motion of extremities
Neuro: Alert oriented x3, motor sensory normal, cranial nerves II through XII 
grossly intact. There were no obvious focal neurologic abnormalities.
Skin: No appreciable rash on exposed skin, skin is warm and dry.
Psych: Mood and affect is normal, memory and judgment is normal.
 
Core Measures
ACS in differential dx? No
CVA/TIA Diagnosis: No
Sepsis Present: No
Sepsis Focused Exam Completed? No
(Doyle Andrea)
 
Progress
Differential Diagnoses
I considered the following diagnoses in my evaluation of the patient: 
 
Plan of Care:
 Orders
 
 
Procedure Date/time Status
 
PARTIAL THROMBOPLASTIN TIME 04/17 1725 Complete
 
PROTHROMBIN TIME 04/17 1725 Complete
 
COMPREHENSIVE METABOLIC PANEL 04/17 1725 Complete
 
CBC WITHOUT DIFFERENTIAL 04/17 1725 Complete
 
 
 Laboratory Tests
 
 
 
04/17/18 1736:
Anion Gap 9, Estimated GFR > 60, BUN/Creatinine Ratio 22.9, Glucose 84, Calcium 
9.2, Total Bilirubin 1.5  H, AST 36, ALT 28, Alkaline Phosphatase 75, Total 
Protein 7.6, Albumin 4.1, Globulin 3.5, Albumin/Globulin Ratio 1.2, PT 12.4, INR
1.14, APTT 33, CBC w Diff NO MAN DIFF REQ, RBC 3.79  L, MCV 95.4, MCH 32.5  H, 
MCHC 34.1, RDW 15.9  H, MPV 7.7, Gran % 69.6, Lymphocytes % 21.5, Monocytes % 
6.1, Eosinophils % 2.5, Basophils % 0.3, Absolute Granulocytes 3.5, Absolute 
Lymphocytes 1.1  L, Absolute Monocytes 0.3, Absolute Eosinophils 0.1, Absolute 
Basophils 0
Patient is resting in no acute distress labs CAT scan ordered
Case discussed with Dr. ward with the patient and agrees with plan.  I 
discussed the patient and her daughter Tamera Jefferson labs the patient was 
given Lovenox during hospitalization, there is no active bleeding at this time 
she is not on any other blood thinners she is not on aspirin feel comfortable 
with close follow-up with primary care.  Cleared for discharge
 
 
Diagnostic Imaging:
Viewed by Me: CT Scan.  Discussed w/RAD: CT Scan. 
Initial ED EKG: none
(Doyle Andrea)
 
Departure
 
Departure
Time of Disposition: 2142
Disposition: HOME OR SELF CARE
Condition: Stable
Clinical Impression
Primary Impression: Epistaxis
Secondary Impressions: Subconjunctival hemorrhage
Referrals:
Koko Ibarra MD (PCP/Family)
 
Additional Instructions:
Follow-up with her primary care physician tomorrow.  Return to emergency room at
anytime sooner with any concerns.  Tylenol if needed for headache.
Departure Forms:
Customer Survey
General Discharge Information
(Doyle Andrea)
 
PA/NP Co-Sign Statement
Statement:
ED Attending supervision documentation-
 
[z] I saw and evaluated the patient. I have also reviewed all the pertinent lab 
results and diagnostic results. I agree with the findings and the plan of care 
as documented in the PA's/NP's documentation. 
 
[] I have reviewed the ED Record and agree with the PA's/NP's documentation.
 
[] Additions or exceptions (if any) to the PAs/NP's note and plan are 
summarized below:
[]
 
 
I saw and personally examined the patient and I agree with the PA.  Right 
subconjunctival hemorrhage`
 
 
 
 
 
4/18/18
9:00 AM
PATIENT CALLED TODAY IN FOLLOW UP, DOING WELL, NO HA, NO FURTHER BLEEDING.
(Yonatan CAMILO,Jayson SALGADO)
 
Critical Care Note
 
Critical Care Note
Critical Care Time: non-applicable
(Doyle Andrea)

## 2018-04-17 NOTE — CT SCAN REPORT
EXAMINATION:
CT HEAD WITHOUT CONTRAST
 
CLINICAL INFORMATION:
Frontal headache. Epistaxis
 
COMPARISON:
CT head 01/09/2014
 
TECHNIQUE:
Contiguous axial imaging was performed from the skull base to vertex without
intravenous administration of contrast.
 
DLP:
607.7 mGy-cm
 
FINDINGS:
There is no evidence of acute intracranial hemorrhage or territorial
infarction. No abnormal mass effect or midline shift is seen. Gray to white
matter differentiation is well preserved. No extra-axial fluid collections
are identified.
 
The ventricles are normal in size. There is no abnormal attenuation within
the brain parenchyma. The osseous structures and soft tissues are normal. The
mastoid air cells and visualized portions of the paranasal sinuses are well
aerated.
 
IMPRESSION:
No acute intracranial pathology.